# Patient Record
Sex: MALE | Race: WHITE | NOT HISPANIC OR LATINO | Employment: OTHER | URBAN - METROPOLITAN AREA
[De-identification: names, ages, dates, MRNs, and addresses within clinical notes are randomized per-mention and may not be internally consistent; named-entity substitution may affect disease eponyms.]

---

## 2018-05-08 ENCOUNTER — TRANSCRIBE ORDERS (OUTPATIENT)
Dept: ADMINISTRATIVE | Facility: HOSPITAL | Age: 68
End: 2018-05-08

## 2018-05-08 DIAGNOSIS — R10.9 ABDOMINAL PAIN, UNSPECIFIED ABDOMINAL LOCATION: Primary | ICD-10-CM

## 2018-05-09 ENCOUNTER — HOSPITAL ENCOUNTER (OUTPATIENT)
Dept: ULTRASOUND IMAGING | Facility: CLINIC | Age: 68
Discharge: HOME | End: 2018-05-09
Payer: MEDICARE

## 2018-05-09 DIAGNOSIS — R10.9 ABDOMINAL PAIN, UNSPECIFIED ABDOMINAL LOCATION: ICD-10-CM

## 2018-05-09 PROCEDURE — 76700 US EXAM ABDOM COMPLETE: CPT

## 2018-05-21 ENCOUNTER — TRANSCRIBE ORDERS (OUTPATIENT)
Dept: ADMINISTRATIVE | Facility: HOSPITAL | Age: 68
End: 2018-05-21

## 2018-05-21 DIAGNOSIS — N28.89 URETERAL FISTULA: Primary | ICD-10-CM

## 2018-05-24 ENCOUNTER — HOSPITAL ENCOUNTER (OUTPATIENT)
Dept: CT IMAGING | Facility: CLINIC | Age: 68
Discharge: HOME | End: 2018-05-24
Payer: MEDICARE

## 2018-05-24 DIAGNOSIS — N28.89 URETERAL FISTULA: ICD-10-CM

## 2018-05-24 PROCEDURE — 74178 CT ABD&PLV WO CNTR FLWD CNTR: CPT

## 2018-05-24 RX ADMIN — IOHEXOL 100 ML: 350 INJECTION, SOLUTION INTRAVENOUS at 10:37

## 2020-10-30 ENCOUNTER — TRANSCRIBE ORDERS (OUTPATIENT)
Dept: ADMINISTRATIVE | Facility: HOSPITAL | Age: 70
End: 2020-10-30

## 2020-10-30 DIAGNOSIS — Q44.5 CHOLEDOCHOCELE: Primary | ICD-10-CM

## 2020-11-13 ENCOUNTER — HOSPITAL ENCOUNTER (OUTPATIENT)
Dept: MRI IMAGING | Facility: HOSPITAL | Age: 70
Discharge: HOME | End: 2020-11-13
Payer: MEDICARE

## 2020-11-13 DIAGNOSIS — Q44.5 CHOLEDOCHOCELE: ICD-10-CM

## 2020-11-13 PROCEDURE — 74183 MRI ABD W/O CNTR FLWD CNTR: CPT

## 2020-11-13 PROCEDURE — G1004 CDSM NDSC: HCPCS

## 2020-11-13 PROCEDURE — A9585 GADOBUTROL INJECTION: HCPCS | Performed by: RADIOLOGY

## 2020-11-13 RX ADMIN — GADOBUTROL 12 ML: 604.72 INJECTION INTRAVENOUS at 14:52

## 2021-03-09 DIAGNOSIS — Z23 ENCOUNTER FOR IMMUNIZATION: ICD-10-CM

## 2021-08-04 ENCOUNTER — TELEPHONE (OUTPATIENT)
Dept: GERIATRICS | Age: 71
End: 2021-08-04

## 2021-08-04 NOTE — TELEPHONE ENCOUNTER
[address]  [phone]  Telephone Intake: PCP    Referral Source: Self (Spouse is patient of Dr Stewart Severe for PCP)       Caller (and relationship to patient): same     (relationship to patient): Kirsten Olivier Person Phone Number: [phone]   Is caller POA? no    Reason for choosing Geriatric PCP: Patient is 65 and needs someone to coordinate all of their specialist    Any additional concerns that you would like the provider to be aware of: none    NOTE FOR : Dr Wendy Glynn does not accept new PCP patients who reside in facilities (i e  levels of care higher than independent living)  She WILL accept patients at independent living facilities

## 2021-11-05 ENCOUNTER — OFFICE VISIT (OUTPATIENT)
Dept: GASTROENTEROLOGY | Facility: CLINIC | Age: 71
End: 2021-11-05
Payer: MEDICARE

## 2021-11-05 VITALS
BODY MASS INDEX: 34.13 KG/M2 | SYSTOLIC BLOOD PRESSURE: 132 MMHG | HEART RATE: 52 BPM | DIASTOLIC BLOOD PRESSURE: 82 MMHG | HEIGHT: 72 IN | WEIGHT: 252 LBS

## 2021-11-05 DIAGNOSIS — R10.9 CHRONIC FLANK PAIN: ICD-10-CM

## 2021-11-05 DIAGNOSIS — K85.80 OTHER ACUTE PANCREATITIS WITHOUT INFECTION OR NECROSIS: ICD-10-CM

## 2021-11-05 DIAGNOSIS — Q44.5 CHOLEDOCHOCELE: Primary | ICD-10-CM

## 2021-11-05 DIAGNOSIS — G89.29 CHRONIC FLANK PAIN: ICD-10-CM

## 2021-11-05 PROCEDURE — 99204 OFFICE O/P NEW MOD 45 MIN: CPT | Performed by: INTERNAL MEDICINE

## 2021-11-05 RX ORDER — IRBESARTAN 300 MG/1
1 TABLET ORAL DAILY
COMMUNITY

## 2021-11-05 RX ORDER — FINASTERIDE 5 MG
5 TABLET ORAL DAILY
COMMUNITY
Start: 2021-09-09 | End: 2021-12-09

## 2021-11-05 RX ORDER — ROSUVASTATIN CALCIUM 10 MG/1
10 TABLET, FILM COATED ORAL DAILY
COMMUNITY
Start: 2021-09-03 | End: 2021-12-09

## 2021-11-05 RX ORDER — RIFAXIMIN 550 MG/1
1 TABLET ORAL 2 TIMES DAILY PRN
COMMUNITY
Start: 2021-08-17

## 2021-11-05 RX ORDER — BETAMETHASONE DIPROPIONATE 0.5 MG/G
1 OINTMENT TOPICAL DAILY PRN
COMMUNITY

## 2021-11-05 RX ORDER — MAG HYDROX/ALUMINUM HYD/SIMETH 400-400-40
1 SUSPENSION, ORAL (FINAL DOSE FORM) ORAL DAILY
COMMUNITY
End: 2022-07-13

## 2021-11-05 RX ORDER — VIT A/VIT C/VIT E/ZINC/COPPER 4296-226
1 CAPSULE ORAL DAILY
COMMUNITY

## 2021-11-05 RX ORDER — METOPROLOL SUCCINATE 100 MG/1
100 TABLET, EXTENDED RELEASE ORAL DAILY
COMMUNITY
Start: 2021-09-02

## 2021-11-05 RX ORDER — MELOXICAM 15 MG/1
15 TABLET ORAL DAILY
COMMUNITY
Start: 2021-09-22

## 2021-11-05 RX ORDER — VITAMIN E/VITAMINS A AND D
1 CREAM (GRAM) TOPICAL DAILY PRN
COMMUNITY
End: 2022-07-13

## 2021-12-09 ENCOUNTER — OFFICE VISIT (OUTPATIENT)
Dept: GERIATRICS | Age: 71
End: 2021-12-09
Payer: MEDICARE

## 2021-12-09 VITALS
OXYGEN SATURATION: 93 % | BODY MASS INDEX: 36.21 KG/M2 | RESPIRATION RATE: 18 BRPM | HEIGHT: 73 IN | WEIGHT: 273.2 LBS | HEART RATE: 59 BPM | DIASTOLIC BLOOD PRESSURE: 70 MMHG | TEMPERATURE: 96.8 F | SYSTOLIC BLOOD PRESSURE: 134 MMHG

## 2021-12-09 DIAGNOSIS — K29.80 DUODENITIS DETERMINED BY BIOPSY: ICD-10-CM

## 2021-12-09 DIAGNOSIS — E78.2 MIXED HYPERLIPIDEMIA: ICD-10-CM

## 2021-12-09 DIAGNOSIS — K58.0 IRRITABLE BOWEL SYNDROME WITH DIARRHEA: ICD-10-CM

## 2021-12-09 DIAGNOSIS — L21.9 SEBORRHEIC DERMATITIS: ICD-10-CM

## 2021-12-09 DIAGNOSIS — E66.09 CLASS 2 OBESITY DUE TO EXCESS CALORIES WITHOUT SERIOUS COMORBIDITY WITH BODY MASS INDEX (BMI) OF 35.0 TO 35.9 IN ADULT: ICD-10-CM

## 2021-12-09 DIAGNOSIS — Q44.5 CHOLEDOCHOCELE: ICD-10-CM

## 2021-12-09 DIAGNOSIS — N40.1 BENIGN PROSTATIC HYPERPLASIA WITH URINARY FREQUENCY: ICD-10-CM

## 2021-12-09 DIAGNOSIS — K63.89 SMALL INTESTINAL BACTERIAL OVERGROWTH (SIBO): Primary | ICD-10-CM

## 2021-12-09 DIAGNOSIS — I10 ESSENTIAL HYPERTENSION: ICD-10-CM

## 2021-12-09 DIAGNOSIS — Z23 NEED FOR VACCINATION: ICD-10-CM

## 2021-12-09 DIAGNOSIS — D12.3 ADENOMATOUS POLYP OF TRANSVERSE COLON: ICD-10-CM

## 2021-12-09 DIAGNOSIS — R35.0 BENIGN PROSTATIC HYPERPLASIA WITH URINARY FREQUENCY: ICD-10-CM

## 2021-12-09 DIAGNOSIS — M19.90 ARTHRITIS: ICD-10-CM

## 2021-12-09 DIAGNOSIS — H61.22 IMPACTED CERUMEN OF LEFT EAR: ICD-10-CM

## 2021-12-09 DIAGNOSIS — H10.13 ALLERGIC CONJUNCTIVITIS OF BOTH EYES: ICD-10-CM

## 2021-12-09 PROBLEM — R73.03 PREDIABETES: Status: ACTIVE | Noted: 2020-03-10

## 2021-12-09 PROBLEM — E66.812 CLASS 2 OBESITY DUE TO EXCESS CALORIES WITHOUT SERIOUS COMORBIDITY WITH BODY MASS INDEX (BMI) OF 35.0 TO 35.9 IN ADULT: Status: ACTIVE | Noted: 2021-12-09

## 2021-12-09 PROBLEM — R73.03 PREDIABETES: Status: RESOLVED | Noted: 2020-03-10 | Resolved: 2021-12-09

## 2021-12-09 PROBLEM — K63.8219 SMALL INTESTINAL BACTERIAL OVERGROWTH (SIBO): Status: ACTIVE | Noted: 2021-12-09

## 2021-12-09 PROCEDURE — 69210 REMOVE IMPACTED EAR WAX UNI: CPT | Performed by: INTERNAL MEDICINE

## 2021-12-09 PROCEDURE — 99205 OFFICE O/P NEW HI 60 MIN: CPT | Performed by: INTERNAL MEDICINE

## 2021-12-09 RX ORDER — ROSUVASTATIN CALCIUM 10 MG/1
10 TABLET, COATED ORAL DAILY
COMMUNITY

## 2021-12-09 RX ORDER — TAMSULOSIN HYDROCHLORIDE 0.4 MG/1
0.4 CAPSULE ORAL
Qty: 90 CAPSULE | Refills: 3 | Status: SHIPPED | OUTPATIENT
Start: 2021-12-09

## 2021-12-09 RX ORDER — ASPIRIN 81 MG/1
81 TABLET ORAL DAILY
COMMUNITY

## 2021-12-09 RX ORDER — LIFITEGRAST 50 MG/ML
1 SOLUTION/ DROPS OPHTHALMIC 2 TIMES DAILY
COMMUNITY
Start: 2021-12-09

## 2021-12-09 RX ORDER — ESOMEPRAZOLE MAGNESIUM 40 MG/1
40 CAPSULE, DELAYED RELEASE ORAL DAILY
COMMUNITY

## 2021-12-09 RX ORDER — FINASTERIDE 5 MG/1
5 TABLET, FILM COATED ORAL DAILY
COMMUNITY

## 2021-12-09 RX ORDER — BEPOTASTINE BESILATE 15 MG/ML
1 SOLUTION/ DROPS OPHTHALMIC 2 TIMES DAILY PRN
COMMUNITY
Start: 2021-11-11

## 2021-12-09 RX ORDER — METRONIDAZOLE 10 MG/G
1 GEL TOPICAL DAILY PRN
COMMUNITY

## 2022-01-03 ENCOUNTER — TELEPHONE (OUTPATIENT)
Dept: SURGERY | Facility: HOSPITAL | Age: 72
End: 2022-01-03

## 2022-01-04 ENCOUNTER — ANESTHESIA (OUTPATIENT)
Dept: PERIOP | Facility: HOSPITAL | Age: 72
End: 2022-01-04

## 2022-01-04 ENCOUNTER — HOSPITAL ENCOUNTER (OUTPATIENT)
Dept: PERIOP | Facility: HOSPITAL | Age: 72
Setting detail: OUTPATIENT SURGERY
Discharge: HOME | End: 2022-01-04
Attending: INTERNAL MEDICINE
Payer: MEDICARE

## 2022-01-04 ENCOUNTER — ANESTHESIA EVENT (OUTPATIENT)
Dept: PERIOP | Facility: HOSPITAL | Age: 72
End: 2022-01-04

## 2022-01-04 ENCOUNTER — HOSPITAL ENCOUNTER (OUTPATIENT)
Dept: RADIOLOGY | Facility: HOSPITAL | Age: 72
Setting detail: OUTPATIENT SURGERY
Discharge: HOME | End: 2022-01-04
Payer: MEDICARE

## 2022-01-04 VITALS
HEIGHT: 72 IN | WEIGHT: 273.15 LBS | SYSTOLIC BLOOD PRESSURE: 139 MMHG | HEART RATE: 60 BPM | TEMPERATURE: 97.2 F | RESPIRATION RATE: 14 BRPM | BODY MASS INDEX: 37 KG/M2 | DIASTOLIC BLOOD PRESSURE: 71 MMHG | OXYGEN SATURATION: 94 %

## 2022-01-04 DIAGNOSIS — Q44.5 CHOLEDOCHOCELE: ICD-10-CM

## 2022-01-04 PROCEDURE — C1769 GUIDE WIRE: HCPCS

## 2022-01-04 PROCEDURE — 43262 ENDO CHOLANGIOPANCREATOGRAPH: CPT | Performed by: INTERNAL MEDICINE

## 2022-01-04 PROCEDURE — C1726 CATH, BAL DIL, NON-VASCULAR: HCPCS

## 2022-01-04 PROCEDURE — 43277 ERCP EA DUCT/AMPULLA DILATE: CPT | Performed by: INTERNAL MEDICINE

## 2022-01-04 PROCEDURE — 74328 X-RAY BILE DUCT ENDOSCOPY: CPT

## 2022-01-04 RX ORDER — FENTANYL CITRATE/PF 50 MCG/ML
50 SYRINGE (ML) INJECTION
Status: DISCONTINUED | OUTPATIENT
Start: 2022-01-04 | End: 2022-01-04 | Stop reason: HOSPADM

## 2022-01-04 RX ORDER — EPHEDRINE SULFATE 50 MG/ML
INJECTION INTRAVENOUS AS NEEDED
Status: DISCONTINUED | OUTPATIENT
Start: 2022-01-04 | End: 2022-01-04

## 2022-01-04 RX ORDER — NEOSTIGMINE METHYLSULFATE 1 MG/ML
INJECTION INTRAVENOUS AS NEEDED
Status: DISCONTINUED | OUTPATIENT
Start: 2022-01-04 | End: 2022-01-04

## 2022-01-04 RX ORDER — LIDOCAINE HYDROCHLORIDE 10 MG/ML
INJECTION, SOLUTION EPIDURAL; INFILTRATION; INTRACAUDAL; PERINEURAL AS NEEDED
Status: DISCONTINUED | OUTPATIENT
Start: 2022-01-04 | End: 2022-01-04

## 2022-01-04 RX ORDER — GLYCOPYRROLATE 0.2 MG/ML
INJECTION INTRAMUSCULAR; INTRAVENOUS AS NEEDED
Status: DISCONTINUED | OUTPATIENT
Start: 2022-01-04 | End: 2022-01-04

## 2022-01-04 RX ORDER — PROPOFOL 10 MG/ML
INJECTION, EMULSION INTRAVENOUS AS NEEDED
Status: DISCONTINUED | OUTPATIENT
Start: 2022-01-04 | End: 2022-01-04

## 2022-01-04 RX ORDER — ONDANSETRON 2 MG/ML
4 INJECTION INTRAMUSCULAR; INTRAVENOUS ONCE AS NEEDED
Status: DISCONTINUED | OUTPATIENT
Start: 2022-01-04 | End: 2022-01-04 | Stop reason: HOSPADM

## 2022-01-04 RX ORDER — SODIUM CHLORIDE, SODIUM LACTATE, POTASSIUM CHLORIDE, CALCIUM CHLORIDE 600; 310; 30; 20 MG/100ML; MG/100ML; MG/100ML; MG/100ML
125 INJECTION, SOLUTION INTRAVENOUS CONTINUOUS
Status: DISCONTINUED | OUTPATIENT
Start: 2022-01-04 | End: 2022-01-08 | Stop reason: HOSPADM

## 2022-01-04 RX ORDER — ONDANSETRON 2 MG/ML
INJECTION INTRAMUSCULAR; INTRAVENOUS AS NEEDED
Status: DISCONTINUED | OUTPATIENT
Start: 2022-01-04 | End: 2022-01-04

## 2022-01-04 RX ORDER — SODIUM CHLORIDE, SODIUM LACTATE, POTASSIUM CHLORIDE, CALCIUM CHLORIDE 600; 310; 30; 20 MG/100ML; MG/100ML; MG/100ML; MG/100ML
100 INJECTION, SOLUTION INTRAVENOUS CONTINUOUS
Status: CANCELLED | OUTPATIENT
Start: 2022-01-04

## 2022-01-04 RX ORDER — ROCURONIUM BROMIDE 10 MG/ML
INJECTION, SOLUTION INTRAVENOUS AS NEEDED
Status: DISCONTINUED | OUTPATIENT
Start: 2022-01-04 | End: 2022-01-04

## 2022-01-04 RX ADMIN — IOHEXOL 35 ML: 240 INJECTION, SOLUTION INTRATHECAL; INTRAVASCULAR; INTRAVENOUS; ORAL at 13:50

## 2022-01-04 RX ADMIN — INDOMETHACIN 100 MG: 50 SUPPOSITORY RECTAL at 13:13

## 2022-01-04 RX ADMIN — ONDANSETRON 4 MG: 2 INJECTION INTRAMUSCULAR; INTRAVENOUS at 13:30

## 2022-01-04 RX ADMIN — PROPOFOL 200 MG: 10 INJECTION, EMULSION INTRAVENOUS at 13:16

## 2022-01-04 RX ADMIN — NEOSTIGMINE METHYLSULFATE 4 MG: 1 INJECTION INTRAVENOUS at 13:50

## 2022-01-04 RX ADMIN — LIDOCAINE HYDROCHLORIDE 50 MG: 10 INJECTION, SOLUTION EPIDURAL; INFILTRATION; INTRACAUDAL; PERINEURAL at 13:17

## 2022-01-04 RX ADMIN — EPHEDRINE SULFATE 10 MG: 50 INJECTION, SOLUTION INTRAVENOUS at 13:45

## 2022-01-04 RX ADMIN — GLYCOPYRROLATE 0.4 MG: 0.2 INJECTION, SOLUTION INTRAMUSCULAR; INTRAVENOUS at 13:50

## 2022-01-04 RX ADMIN — ROCURONIUM BROMIDE 30 MG: 10 INJECTION, SOLUTION INTRAVENOUS at 13:17

## 2022-01-04 RX ADMIN — SODIUM CHLORIDE, SODIUM LACTATE, POTASSIUM CHLORIDE, AND CALCIUM CHLORIDE 125 ML/HR: .6; .31; .03; .02 INJECTION, SOLUTION INTRAVENOUS at 11:23

## 2022-01-04 NOTE — ANESTHESIA PREPROCEDURE EVALUATION
Procedure:  ERCP    Relevant Problems   CARDIO   (+) Essential hypertension   (+) Mixed hyperlipidemia      MUSCULOSKELETAL   (+) Arthritis        Physical Exam    Airway    Mallampati score: III  TM Distance: >3 FB  Neck ROM: full     Dental   No notable dental hx     Cardiovascular  Rhythm: regular, Rate: normal, No murmur, Cardiovascular exam normal    Pulmonary  Pulmonary exam normal Breath sounds clear to auscultation, No wheezes,     Other Findings        Anesthesia Plan  ASA Score- 3     Anesthesia Type- general with ASA Monitors  Additional Monitors:   Airway Plan: ETT  Plan Factors-Exercise tolerance (METS): >4 METS  Chart reviewed  EKG reviewed  Existing labs reviewed  Patient is not a current smoker  Patient instructed to abstain from smoking on day of procedure  Patient did not smoke on day of surgery  There is medical exclusion for perioperative obstructive sleep apnea risk education  Induction- intravenous  Postoperative Plan-   Planned trial extubation    Informed Consent- Anesthetic plan and risks discussed with patient  I personally reviewed this patient with the CRNA  Discussed and agreed on the Anesthesia Plan with the CRNA  Kitty Key

## 2022-01-04 NOTE — ANESTHESIA POSTPROCEDURE EVALUATION
Post-Op Assessment Note    CV Status:  Stable  Pain Score: 0    Pain management: adequate     Mental Status:  Alert   Hydration Status:  Stable   PONV Controlled:  Controlled   Airway Patency:  Patent      Post Op Vitals Reviewed: Yes      Staff: Anesthesiologist, CRNA         No complications documented      BP     Temp (!) (P) 97 2 °F (36 2 °C) (01/04/22 1400)    Pulse     Resp (P) 16 (01/04/22 1400)    SpO2

## 2022-01-04 NOTE — DISCHARGE INSTRUCTIONS
ERCP (Endoscopic Retrograde Cholangiopancreatography)   WHAT YOU NEED TO KNOW:   An ERCP (endoscopic retrograde cholangiopancreatography) is a procedure to examine the ducts of your pancreas or gallbladder  ERCP may also be used to open blocked ducts, or to diagnose problems with your pancreas or gallbladder  An endoscope (thin, flexible tube with a light) will be used for the procedure  DISCHARGE INSTRUCTIONS:   Seek care immediately if:   · You have sudden, severe abdominal pain  · You have problems swallowing  · You have a large amount of black, sticky bowel movements or blood in your bowel movements  · You have sudden trouble breathing  · You feel weak, lightheaded, or faint or your heart beats faster than normal for you  Contact your healthcare provider if:   · You have a fever and chills  · You have nausea or are vomiting  · Your abdomen is bloated or feels full and hard  · You have abdominal pain  · You have a large amount of black, sticky bowel movements or blood in your bowel movements  · You have not had a bowel movement for 3 days after your procedure  · You have rash or hives  · You lose your appetite, your skin feels itchy, and your skin turns yellow  · You have questions or concerns about your procedure  Self-care:   ·      Rest when you feel it is needed  You may be drowsy for up to 24 hours after your procedure  Return to your daily activities as directed  ·       Ask when you can eat regular foods  Healthy foods include fruits, vegetables, whole-grain breads, low-fat dairy products, beans, lean meats, and fish  ·       Relieve a sore throat with ice chips, liquids, or lozenges as directed  Follow up with your healthcare provider as directed: Write down your questions so you remember to ask them during your visits        If you take a blood thinner, please review the specific instructions from your endoscopist about when you should resume it  These can be found in the Recommendation and Your Medication list sections of this After Visit Summary

## 2022-01-04 NOTE — H&P
History and Physical -  [facility]  Sanna De La Rosa 66 y o  male MRN: [id number]      HPI: Sanna De La Rosa is a 66y o  year old male who presents for recurring episodes of diarrhea as well as flank pain with a known history of a type 3 choledochocele      REVIEW OF SYSTEMS: Per the HPI, and otherwise unremarkable  Historical Information   Past Medical History:   Diagnosis Date    Adenomatous polyp of transverse colon 12/9/2021    10/26/20 Tubular adenoma with focal high grade dysplasia  Also transverse tubular adenoma, Splenic flexure tubular adenoma    Arthritis     Hyperlipidemia     Hypertension      Past Surgical History:   Procedure Laterality Date    CHOLECYSTECTOMY  2001    EYE SURGERY       Social History   Social History     Substance and Sexual Activity   Alcohol Use Yes    Comment: occ     Social History     Substance and Sexual Activity   Drug Use Never     Social History     Tobacco Use   Smoking Status Never Smoker   Smokeless Tobacco Never Used     Family History   Problem Relation Age of Onset    Cancer Mother     Cancer Father        Meds/Allergies     (Not in a hospital admission)      No Known Allergies    Objective     Blood pressure (!) 176/96, pulse 78, temperature 97 9 °F (36 6 °C), temperature source Temporal, resp  rate 18, height 6' (1 829 m), weight 124 kg (273 lb 2 4 oz), SpO2 97 %  PHYSICAL EXAM    Gen: NAD  CV: RRR  CHEST: Clear  ABD: soft, NT/ND  EXT: no edema      ASSESSMENT/PLAN:  This is a 66y o  year old male here for endoscopic retrograde cholangiopancreatography with sphincterotomy, and he is stable and optimized for his procedure

## 2022-01-17 ENCOUNTER — TELEPHONE (OUTPATIENT)
Dept: GASTROENTEROLOGY | Facility: CLINIC | Age: 72
End: 2022-01-17

## 2022-01-17 NOTE — TELEPHONE ENCOUNTER
Unfortunately if patient has post ERCP pancreatitis he should be evaluated in the emergency room and will need IV fluids  If patient is still in severe pain I would instruct him to go to the emergency room  If his pain has improved we could order outpatient laboratories and a CT scan    Thank you

## 2022-01-17 NOTE — TELEPHONE ENCOUNTER
----- Message from [facility] sent at 2022  3:22 PM EST -----  Regarding: Post procedure pain  On 22 I had a procedure at the [facility] performed by Dr Wadie Gilford  He advised that I may experience a pancreatitis type pain  He was correct and the pain level is higher than prior to the procedure  I have a follow up visit on 22 but would prefer not to remain in pain until then  Also, the dr advised that the procedure may not have addressed the issue and I agree  Is there anything I can do or take to ease the pain level in the interim  Also, if there are tests needed to re-diagnose the problem I would prefer to get them done prior to the office visit so the dr will be able to help with this problem  Please advise     [facility]  1950  [phone]

## 2022-01-25 ENCOUNTER — TELEPHONE (OUTPATIENT)
Dept: GASTROENTEROLOGY | Facility: CLINIC | Age: 72
End: 2022-01-25

## 2022-01-25 NOTE — TELEPHONE ENCOUNTER
lmom patient has appt on 2/16 with Dr Chandni Clarke advised in message I would send this off to see if this appt should be moved up

## 2022-01-25 NOTE — TELEPHONE ENCOUNTER
----- Message from Oscar Agustin sent at 2022 12:15 PM EST -----  Regarding: Next visit on   I have a projected follow up visit scheduled as above  Since my procedure on 22 I have continued to have the pain/discomfort in the right rear quadrant of my abdomen  The frequency of the pain, as well as the severity, is as before the procedure  I am expecting to have to schedule additional testing  If these tests can be ordered by the doctor, and I can get them accomplished prior to my scheduled visit, that will expedite the diagnosis  Please advise me if this is a possibility   Thanks  Cassie mistry 1950

## 2022-01-25 NOTE — TELEPHONE ENCOUNTER
Dr Suki Dolan - patient called back  He would like to have Dr Suki Dolan, place the orders for the additional test the Doctor would like patient to have, so the results are back before patient's office visit on 02/16/22  Kale Bhandari Please call patient when orders are entered at [phone]

## 2022-01-25 NOTE — TELEPHONE ENCOUNTER
Advised patient also gave central scheduling's number, will come by to  lab orders he goes to [facility]

## 2022-02-02 ENCOUNTER — APPOINTMENT (OUTPATIENT)
Dept: LAB | Facility: HOSPITAL | Age: 72
End: 2022-02-02
Payer: MEDICARE

## 2022-02-02 ENCOUNTER — HOSPITAL ENCOUNTER (OUTPATIENT)
Dept: CT IMAGING | Facility: HOSPITAL | Age: 72
Discharge: HOME | End: 2022-02-02
Payer: MEDICARE

## 2022-02-02 DIAGNOSIS — Q44.5 CHOLEDOCHOCELE: ICD-10-CM

## 2022-02-02 PROCEDURE — G1004 CDSM NDSC: HCPCS

## 2022-02-02 PROCEDURE — 74177 CT ABD & PELVIS W/CONTRAST: CPT

## 2022-02-02 RX ADMIN — IOHEXOL 100 ML: 350 INJECTION, SOLUTION INTRAVENOUS at 10:11

## 2022-02-16 ENCOUNTER — OFFICE VISIT (OUTPATIENT)
Dept: GASTROENTEROLOGY | Facility: CLINIC | Age: 72
End: 2022-02-16
Payer: MEDICARE

## 2022-02-16 VITALS
HEIGHT: 72 IN | DIASTOLIC BLOOD PRESSURE: 82 MMHG | HEART RATE: 61 BPM | WEIGHT: 268 LBS | SYSTOLIC BLOOD PRESSURE: 140 MMHG | BODY MASS INDEX: 36.3 KG/M2

## 2022-02-16 DIAGNOSIS — R10.9 RIGHT FLANK PAIN, CHRONIC: Primary | ICD-10-CM

## 2022-02-16 DIAGNOSIS — G89.29 RIGHT FLANK PAIN, CHRONIC: Primary | ICD-10-CM

## 2022-02-16 PROCEDURE — 99214 OFFICE O/P EST MOD 30 MIN: CPT | Performed by: INTERNAL MEDICINE

## 2022-02-16 PROCEDURE — 1124F ACP DISCUSS-NO DSCNMKR DOCD: CPT | Performed by: INTERNAL MEDICINE

## 2022-02-16 NOTE — PROGRESS NOTES
[facility] - Outpatient Follow-up Note  Maury Galvan 66 y o  male MRN: [id number]  Encounter: [id number]          ASSESSMENT AND PLAN:      1  Right flank pain, chronic    No additional medical therapy required at this time  I reassured the patient and told him that it is quite possible in this right flank pain is a musculoskeletal pain  It is also possible this could be related to a referred pain that is a from gastric distention  No additional diagnostic testing is required at this time    We talked about the diverticulosis as seen on CT scan and the importance of a high-fiber diet on a daily basis  Questions were answered regarding his atelectasis and wanted represented     ______________________________________________________________________    LENORESantadrian Fritz returns the office today for routine follow-up  He states that he has been having last right sided flank pain  The pain seems to come on unpredictably  It is not related eating  The episodes are less frequent and perhaps less severe  He denies any new symptoms  He denies any nausea vomiting  There is no heartburn or dysphagia  He had questions about his CT scan including a question about atelectasis  There is no fever chills  He does complain of night sweats which have been chronic and unpredictable over the past decade or more  He denies any significant weight loss or weight gain  He asked about the finding of atelectasis as seen on his CT scan  CT scan also showed diverticulosis coli  There was no acute abdominal pathology identified  REVIEW OF SYSTEMS IS OTHERWISE NEGATIVE  Historical Information   Past Medical History:   Diagnosis Date    Adenomatous polyp of transverse colon 12/9/2021    10/26/20 Tubular adenoma with focal high grade dysplasia   Also transverse tubular adenoma, Splenic flexure tubular adenoma    Arthritis     Hyperlipidemia     Hypertension      Past Surgical History: Procedure Laterality Date    CHOLECYSTECTOMY  2001    EYE SURGERY       Social History   Social History     Substance and Sexual Activity   Alcohol Use Yes    Comment: occ     Social History     Substance and Sexual Activity   Drug Use Never     Social History     Tobacco Use   Smoking Status Never Smoker   Smokeless Tobacco Never Used     Family History   Problem Relation Age of Onset    Cancer Mother     Cancer Father        Meds/Allergies       Current Outpatient Medications:     aspirin (ECOTRIN LOW STRENGTH) 81 mg EC tablet    bepotastine besilate (BEPREVE) 1 5 % op soln    betamethasone, augmented, (Diprolene) 0 05 % ointment    Cholecalciferol 50 MCG (2000 UT) CAPS    esomeprazole (NexIUM) 40 MG capsule    finasteride (PROSCAR) 5 mg tablet    irbesartan (Avapro) 300 mg tablet    meloxicam (MOBIC) 15 mg tablet    metoprolol succinate (TOPROL-XL) 100 mg 24 hr tablet    metroNIDAZOLE (METROGEL) 1 % gel    Multiple Vitamins-Minerals (PreserVision AREDS) CAPS    RA Vitamin E-Vit A & D 89652 units CREA    rosuvastatin (CRESTOR) 10 MG tablet    Saw Palmetto 450 MG CAPS    tamsulosin (FLOMAX) 0 4 mg    Xifaxan 550 MG tablet    Xiidra 5 % op solution    No Known Allergies        Objective     Blood pressure 140/82, pulse 61, height 6' (1 829 m), weight 122 kg (268 lb)  Body mass index is 36 35 kg/m²  PHYSICAL EXAM:      General Appearance:   Alert, cooperative, no distress   HEENT:   Normocephalic, atraumatic, anicteric      Neck:  Supple, symmetrical, trachea midline   Lungs:   Clear to auscultation bilaterally; no rales, rhonchi or wheezing; respirations unlabored    Heart[de-identified]   Regular rate and rhythm; no murmur, rub, or gallop     Abdomen:   Soft, non-tender, non-distended; normal bowel sounds; no masses, no organomegaly    Genitalia:   Deferred    Rectal:   Deferred    Extremities:  No cyanosis, clubbing or edema    Pulses:  2+ and symmetric    Skin:  No jaundice, rashes, or lesions  Lymph nodes:  No palpable cervical lymphadenopathy        Lab Results:   No visits with results within 1 Day(s) from this visit  Latest known visit with results is:   Orders Only on 08/23/2021   Component Date Value    Hemoglobin A1C 08/23/2021 5 5     SARS COV-2 ANTIBODY IGG 08/23/2021 Positive*    SARS COV-2 ANTIBODY IGM 08/23/2021 Negative          Radiology Results:   CT abdomen pelvis w contrast    Result Date: 2/4/2022  Narrative: CT ABDOMEN AND PELVIS WITH IV CONTRAST INDICATION:   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  Abdominal pain COMPARISON:  CT from May 24, 2018 TECHNIQUE:  CT examination of the abdomen and pelvis was performed  Axial, sagittal, and coronal 2D reformatted images were created from the source data and submitted for interpretation  Radiation dose length product (DLP) for this visit:  1423 mGy-cm   This examination, like all CT scans performed in the [facility], was performed utilizing techniques to minimize radiation dose exposure, including the use of iterative reconstruction and automated exposure control  IV Contrast:  100 mL of iohexol (OMNIPAQUE) Enteric Contrast:  Enteric contrast was not administered  FINDINGS: ABDOMEN LOWER CHEST: Bibasilar density seen related to atelectasis LIVER/BILIARY TREE:  Again noted are hepatic cysts, stable the largest hepatic cyst is seen in the segment 2, measuring about 2 4 cm  A segment 7/8 cyst seen measuring about 1 3 cm GALLBLADDER:  Gallbladder is surgically absent SPLEEN:  Unremarkable  PANCREAS:  No pancreatic ductal dilation seen Few foci of calcification are noted within the pancreas Cyst is seen in the pancreatic head, measuring about 7 mm, stable  Additional smaller cysts which were described on the previous MRI are occult on the CT ADRENAL GLANDS:  Unremarkable  KIDNEYS/URETERS:  Unremarkable  No hydronephrosis   STOMACH AND BOWEL:  Diverticulosis seen Ileocecal junction appear unremarkable No abnormal dilation of the small bowel loops seen APPENDIX:  No findings to suggest appendicitis  ABDOMINOPELVIC CAVITY:  No ascites  No pneumoperitoneum  No lymphadenopathy  VESSELS:  Celiac trunk, SMA appear unremarkable DIANA appear unremarkable PELVIS REPRODUCTIVE ORGANS:  Prostate appears unremarkable URINARY BLADDER:  Unremarkable  ABDOMINAL WALL/INGUINAL REGIONS:  Umbilical hernia seen OSSEOUS STRUCTURES:  No acute compression collapse No gross lytic lesion seen     Impression: No acute inflammatory stranding No evidence of pancreatitis Small 7 mm cystic pancreatic lesion as seen on the previous MRI and previous CT from 2018, stable for more than 3 years  Surveillance can be continued as per the MRI recommendations from November 13, 2020 Workstation performed: [id number]     Answers for HPI/ROS submitted by the patient on 2/9/2022  Chronicity: chronic  Onset: more than 1 year ago  Onset quality: sudden  Frequency: daily  Progression since onset: waxing and waning  Pain location: right flank  Pain - numeric: 5/10  Pain quality: burning, cramping  Radiates to: right flank  anorexia: No  arthralgias: No  belching: Yes  constipation: Yes  diarrhea: Yes  dysuria: No  fever: No  flatus: Yes  frequency: Yes  headaches: No  hematochezia: No  hematuria: No  melena: No  myalgias: No  nausea:  No  weight loss: No  vomiting: No  Aggravated by: movement  Relieved by: nothing  Diagnostic workup: CT scan

## 2022-03-03 ENCOUNTER — OFFICE VISIT (OUTPATIENT)
Dept: GERIATRICS | Age: 72
End: 2022-03-03
Payer: MEDICARE

## 2022-03-03 VITALS
BODY MASS INDEX: 35.73 KG/M2 | RESPIRATION RATE: 18 BRPM | OXYGEN SATURATION: 95 % | TEMPERATURE: 97.6 F | HEART RATE: 91 BPM | SYSTOLIC BLOOD PRESSURE: 154 MMHG | HEIGHT: 73 IN | WEIGHT: 269.6 LBS | DIASTOLIC BLOOD PRESSURE: 92 MMHG

## 2022-03-03 DIAGNOSIS — R35.0 BENIGN PROSTATIC HYPERPLASIA WITH URINARY FREQUENCY: ICD-10-CM

## 2022-03-03 DIAGNOSIS — N40.1 BENIGN PROSTATIC HYPERPLASIA WITH URINARY FREQUENCY: ICD-10-CM

## 2022-03-03 DIAGNOSIS — F51.01 PRIMARY INSOMNIA: ICD-10-CM

## 2022-03-03 DIAGNOSIS — R07.89 COSTOCHONDRAL PAIN: Primary | ICD-10-CM

## 2022-03-03 DIAGNOSIS — E66.09 CLASS 2 OBESITY DUE TO EXCESS CALORIES WITHOUT SERIOUS COMORBIDITY WITH BODY MASS INDEX (BMI) OF 35.0 TO 35.9 IN ADULT: ICD-10-CM

## 2022-03-03 PROBLEM — E66.812 CLASS 2 OBESITY DUE TO EXCESS CALORIES WITHOUT SERIOUS COMORBIDITY WITH BODY MASS INDEX (BMI) OF 35.0 TO 35.9 IN ADULT: Chronic | Status: ACTIVE | Noted: 2021-12-09

## 2022-03-03 PROBLEM — Z23 NEED FOR VACCINATION: Chronic | Status: ACTIVE | Noted: 2021-12-09

## 2022-03-03 PROBLEM — K63.8219 SMALL INTESTINAL BACTERIAL OVERGROWTH (SIBO): Chronic | Status: ACTIVE | Noted: 2021-12-09

## 2022-03-03 PROBLEM — K63.89 SMALL INTESTINAL BACTERIAL OVERGROWTH (SIBO): Chronic | Status: ACTIVE | Noted: 2021-12-09

## 2022-03-03 PROCEDURE — 99214 OFFICE O/P EST MOD 30 MIN: CPT | Performed by: INTERNAL MEDICINE

## 2022-03-03 RX ORDER — TRAZODONE HYDROCHLORIDE 50 MG/1
50 TABLET ORAL
Qty: 90 TABLET | Refills: 3 | Status: SHIPPED | OUTPATIENT
Start: 2022-03-03 | End: 2022-04-28 | Stop reason: SDUPTHER

## 2022-03-03 NOTE — PROGRESS NOTES
Assessment/Plan:    1  Costochondral pain  Assessment & Plan:  Counseled on benign syndrome  Recommend weight loss  2  Class 2 obesity due to excess calories without serious comorbidity with body mass index (BMI) of 35 0 to 35 9 in adult  Assessment & Plan:  Counseled extensively on need to exercise regularly  3  Benign prostatic hyperplasia with urinary frequency  Assessment & Plan:  Much improved  Continue finasteride/tamsulosin  4  Primary insomnia  Assessment & Plan:  Recommend to stop Zquil  Will start trial of trazodone  Orders:  -     traZODone (DESYREL) 50 mg tablet; Take 1 tablet (50 mg total) by mouth daily at bedtime        Subjective:      Patient ID: Matteo Quiñones is a 66 y o  male  HPI    Insomnia-reports using ZzzQuil every night to get to sleep  Requests considering something different  BPH-reports much improvement with tamsulosin  Obesity-denies regular exercise  Has treadmill at home but isn't using  Pain-patient reports having pain on right side, reports feels that his internal   Pain appears to be sharp and stabbing seems to come at random without radiation  The following portions of the patient's history were reviewed and updated as appropriate: allergies, current medications, past family history, past medical history, past social history, past surgical history and problem list     Review of Systems   Constitutional: Negative for chills and fever  HENT: Negative for trouble swallowing and voice change  Eyes: Negative for pain and discharge  Respiratory: Negative for shortness of breath and wheezing  Cardiovascular: Negative for chest pain and palpitations  Gastrointestinal: Negative for abdominal pain and blood in stool  Genitourinary: Negative for dysuria and hematuria  Musculoskeletal: Negative for joint swelling and neck pain  Skin: Negative for rash and wound     Allergic/Immunologic: Negative for environmental allergies and food allergies  Neurological: Negative for seizures and syncope  Psychiatric/Behavioral: Negative for dysphoric mood  The patient is not nervous/anxious  Objective:    /92 (BP Location: Left arm, Patient Position: Sitting, Cuff Size: Standard)   Pulse 91   Temp 97 6 °F (36 4 °C) (Temporal)   Resp 18   Ht 6' 1" (1 854 m)   Wt 122 kg (269 lb 9 6 oz)   SpO2 95%   BMI 35 57 kg/m²      Physical Exam  Constitutional:       General: He is not in acute distress  HENT:      Head: Normocephalic and atraumatic  Right Ear: External ear normal       Left Ear: External ear normal       Nose: Nose normal    Eyes:      General: No scleral icterus  Conjunctiva/sclera: Conjunctivae normal    Pulmonary:      Effort: Pulmonary effort is normal  No respiratory distress  Abdominal:      General: There is no distension  Tenderness: There is abdominal tenderness (pain reproduced when pushing on R lower rib, midaxillary line)  Skin:     Coloration: Skin is not jaundiced or pale  Neurological:      General: No focal deficit present  Mental Status: He is alert  Psychiatric:         Mood and Affect: Mood normal          Thought Content:  Thought content normal            Labs/Imagin2022:  WBC 6 8, hemoglobin 16 1, platelet 056, glucose 103, BUN 19, creatinine 1 04, GFR 71, sodium 141, total protein 6 7, albumin 4 6, amylase 65, lipase 37

## 2022-07-13 ENCOUNTER — OFFICE VISIT (OUTPATIENT)
Dept: GERIATRICS | Age: 72
End: 2022-07-13
Payer: MEDICARE

## 2022-07-13 VITALS
TEMPERATURE: 97.9 F | HEIGHT: 73 IN | BODY MASS INDEX: 36.47 KG/M2 | WEIGHT: 275.2 LBS | HEART RATE: 68 BPM | DIASTOLIC BLOOD PRESSURE: 82 MMHG | SYSTOLIC BLOOD PRESSURE: 130 MMHG | OXYGEN SATURATION: 94 % | RESPIRATION RATE: 16 BRPM

## 2022-07-13 DIAGNOSIS — L71.9 ROSACEA: ICD-10-CM

## 2022-07-13 DIAGNOSIS — R35.0 BENIGN PROSTATIC HYPERPLASIA WITH URINARY FREQUENCY: Primary | Chronic | ICD-10-CM

## 2022-07-13 DIAGNOSIS — F51.01 PRIMARY INSOMNIA: Chronic | ICD-10-CM

## 2022-07-13 DIAGNOSIS — N40.1 BENIGN PROSTATIC HYPERPLASIA WITH URINARY FREQUENCY: Primary | Chronic | ICD-10-CM

## 2022-07-13 PROCEDURE — 99214 OFFICE O/P EST MOD 30 MIN: CPT | Performed by: INTERNAL MEDICINE

## 2022-07-13 NOTE — ASSESSMENT & PLAN NOTE
Improved with tamsulosin  Will continue both tamsulosin/finasteride  Considering continued urinary urgency and occasional incontinence despite therapy, refer to urology

## 2022-07-13 NOTE — PROGRESS NOTES
Assessment/Plan:    1  Benign prostatic hyperplasia with urinary frequency  Assessment & Plan:  Improved with tamsulosin  Will continue both tamsulosin/finasteride  Considering continued urinary urgency and occasional incontinence despite therapy, refer to urology  Orders:  -     Ambulatory Referral to Urology; Future    2  Rosacea  -     Ambulatory referral to Dermatology; Future    3  Primary insomnia  Assessment & Plan:  Stable  Continue trazodone          Subjective:      Patient ID: Prashanth Dodge is a 66 y o  male  HPI    BPH - reports continued incontinence despite tamsulosin/finasteride    Rosacea - continues with metronidazole PRN  Does not have dermatologist now  Insomnia - reports doing well with trazodone  The following portions of the patient's history were reviewed and updated as appropriate: allergies, current medications, past family history, past medical history, past social history, past surgical history and problem list     Review of Systems      Objective:    /82 (BP Location: Left arm, Patient Position: Sitting, Cuff Size: Adult)   Pulse 68   Temp 97 9 °F (36 6 °C) (Temporal)   Resp 16   Ht 6' 1" (1 854 m)   Wt 125 kg (275 lb 3 2 oz)   SpO2 94%   BMI 36 31 kg/m²      Physical Exam  Constitutional:       General: He is not in acute distress  HENT:      Head: Normocephalic and atraumatic  Right Ear: External ear normal       Left Ear: External ear normal       Nose: Nose normal    Eyes:      General: No scleral icterus  Conjunctiva/sclera: Conjunctivae normal    Pulmonary:      Effort: Pulmonary effort is normal  No respiratory distress  Abdominal:      General: There is no distension  Skin:     Coloration: Skin is not jaundiced or pale  Neurological:      General: No focal deficit present  Mental Status: He is alert  Psychiatric:         Mood and Affect: Mood normal          Thought Content:  Thought content normal

## 2022-10-03 NOTE — PROGRESS NOTES
Problem List Items Addressed This Visit        Other    Benign prostatic hyperplasia with urinary frequency - Primary (Chronic)    Prostate cancer screening encounter, options and risks discussed      Other Visit Diagnoses     Urgency of urination        Relevant Medications    tolterodine (DETROL LA) 4 mg 24 hr capsule            Discussion:    Tamiko Reid did well during his consultation today  He has been on dual medical therapy with tamsulosin and finasteride for roughly 10 months or so now  This did initially help with his symptoms but he continues with bothersome urgency and frequency of urination  I spoke with him about a trial of an anticholinergic medication we talked about the risks and benefits of this medication including potential side effects such as dry mouth and constipation  This has been sent to his pharmacy  We will assess him with a postvoid residual urine volume and AUA symptom score in 4 weeks to see how well he is tolerating this medication  From a prostate cancer screening perspective he is low risk, PSA is 0 4, this was taken prior to initiation of finasteride as such does not need to be doubled  His prostate is roughly 25-30 grams and smooth without nodules on a digital rectal exam     While he denies snoring he is tired during the day  I do recommend he consider referral by his primary care doctor for a sleep study to look for as yet undiagnosed sleep disordered breathing  He does have nocturia 3 times per night or so which is 1 of his more bothersome symptoms  I did speak with him about the next steps in workup which would include cystoscopy and transrectal ultrasound in terms of workup for potential treatment of the bladder outlet and other therapies such as cystoscopy with botulinum toxin injection or percutaneous tibial nerve stimulation or InterStim placement  All questions and concerns answered and addressed    He will return in 4 weeks    Assessment and plan: Please see problem oriented charting for the assessment plan of today's urological complaints      Gailmarcelino Ordazdana Shen      Chief Complaint     Chief Complaint   Patient presents with    Follow-up         History of Present Illness     Alvaro Heath is a 74 y o  gentleman presenting today for evaluation of lower urinary tract symptoms as referred by Dr Radha Torres consultation has been sent to the referring provider  With regard to this complaint it is localized to the prostate and urinary bladder  The quality is described as urgency and frequency of urination with decrease in his urinary stream and nocturia 3 times per night and the severity of this complaint is described as moderate  These symptoms have been present for months and the timing is ongoing  Previous treatments include tamsulosin and finasteride and previous work-up includes history and physical examination by his primary care provider  The patient mentions nothing and, initially, his prostate medications as aggravating and alleviating factors, respectively  The following associated signs and symptoms are mentioned:  None  The following portions of the patient's history were reviewed and updated as appropriate: allergies, current medications, past family history, past medical history, past social history, past surgical history and problem list         Detailed Urologic History     - please refer to HPI    Review of Systems     Review of Systems   Constitutional: Negative  HENT: Negative  Eyes: Negative  Respiratory: Negative  Cardiovascular: Negative  Gastrointestinal: Negative  Endocrine: Negative  Genitourinary: Positive for frequency and urgency  Musculoskeletal: Negative  Skin: Negative  Allergic/Immunologic: Negative  Neurological: Negative  Hematological: Negative  Psychiatric/Behavioral: Negative          AUA SYMPTOM SCORE    Flowsheet Row Most Recent Value   AUA SYMPTOM SCORE    How often have you had a sensation of not emptying your bladder completely after you finished urinating? 3 (P)     How often have you had to urinate again less than two hours after you finished urinating? 4 (P)     How often have you found you stopped and started again several times when you urinate? 3 (P)     How often have you found it difficult to postpone urination? 4 (P)     How often have you had a weak urinary stream? 4 (P)     How often have you had to push or strain to begin urination? 2 (P)     How many times did you most typically get up to urinate from the time you went to bed at night until the time you got up in the morning? 3 (P)     Quality of Life: If you were to spend the rest of your life with your urinary condition just the way it is now, how would you feel about that? 4 (P)     AUA SYMPTOM SCORE 23 (P)             Allergies     No Known Allergies    Physical Exam     Physical Exam  Vitals reviewed  Constitutional:       General: He is not in acute distress  Appearance: Normal appearance  He is not ill-appearing, toxic-appearing or diaphoretic  HENT:      Head: Normocephalic and atraumatic  Eyes:      General: No scleral icterus  Right eye: No discharge  Left eye: No discharge  Cardiovascular:      Pulses: Normal pulses  Pulmonary:      Effort: Pulmonary effort is normal    Abdominal:      General: There is no distension  Palpations: There is no mass  Musculoskeletal:         General: No swelling  Skin:     General: Skin is warm  Neurological:      General: No focal deficit present  Mental Status: He is alert and oriented to person, place, and time  Cranial Nerves: No cranial nerve deficit  Psychiatric:         Mood and Affect: Mood normal          Behavior: Behavior normal          Thought Content:  Thought content normal          Judgment: Judgment normal              Vital Signs  Vitals:    10/04/22 0921   BP: 138/70   BP Location: Left arm   Patient Position: Sitting   Cuff Size: Large   Pulse: 88   Resp: 16   SpO2: 97%   Weight: 125 kg (276 lb 9 6 oz)   Height: 6' 1" (1 854 m)         Current Medications       Current Outpatient Medications:     aspirin (ECOTRIN LOW STRENGTH) 81 mg EC tablet, Take 81 mg by mouth daily, Disp: , Rfl:     bepotastine besilate (BEPREVE) 1 5 % op soln, Administer 1 drop to both eyes 2 (two) times a day as needed , Disp: , Rfl:     betamethasone, augmented, (DIPROLENE) 0 05 % ointment, Apply 1 application topically daily as needed For rosacea , Disp: , Rfl:     Cholecalciferol 50 MCG (2000 UT) CAPS, Take 2,000 Units by mouth daily, Disp: , Rfl:     esomeprazole (NexIUM) 40 MG capsule, Take 40 mg by mouth daily, Disp: , Rfl:     finasteride (PROSCAR) 5 mg tablet, Take 5 mg by mouth daily, Disp: , Rfl:     gabapentin (NEURONTIN) 300 mg capsule, , Disp: , Rfl:     irbesartan (AVAPRO) 300 mg tablet, Take 1 tablet by mouth Daily , Disp: , Rfl:     meloxicam (MOBIC) 15 mg tablet, Take 15 mg by mouth daily, Disp: , Rfl:     metoprolol succinate (TOPROL-XL) 100 mg 24 hr tablet, Take 100 mg by mouth daily, Disp: , Rfl:     metroNIDAZOLE (METROGEL) 1 % gel, Apply 1 application topically daily as needed, Disp: , Rfl:     Multiple Vitamins-Minerals (PreserVision AREDS) CAPS, Take 1 capsule by mouth daily , Disp: , Rfl:     rosuvastatin (CRESTOR) 10 MG tablet, Take 10 mg by mouth daily, Disp: , Rfl:     tamsulosin (FLOMAX) 0 4 mg, Take 1 capsule (0 4 mg total) by mouth daily with dinner, Disp: 90 capsule, Rfl: 3    tolterodine (DETROL LA) 4 mg 24 hr capsule, Take 1 capsule (4 mg total) by mouth daily, Disp: 30 capsule, Rfl: 0    traZODone (DESYREL) 100 mg tablet, Take 1 tablet (100 mg total) by mouth daily at bedtime, Disp: 90 tablet, Rfl: 3    Xiidra 5 % op solution, Administer 1 drop to both eyes 2 (two) times a day , Disp: , Rfl:       Active Problems     Patient Active Problem List   Diagnosis    Irritable bowel syndrome with diarrhea    Mixed hyperlipidemia    Essential hypertension    Benign prostatic hyperplasia with urinary frequency    Duodenitis determined by biopsy    Seborrheic dermatitis    Small intestinal bacterial overgrowth (SIBO)    Choledochocele    Class 2 obesity due to excess calories without serious comorbidity with body mass index (BMI) of 35 0 to 35 9 in adult    Allergic conjunctivitis of both eyes    Need for vaccination    Adenomatous polyp of transverse colon    Arthritis    Costochondral pain    Primary insomnia    Rosacea    Prostate cancer screening encounter, options and risks discussed         Past Medical History     Past Medical History:   Diagnosis Date    Adenomatous polyp of transverse colon 12/9/2021    10/26/20 Tubular adenoma with focal high grade dysplasia   Also transverse tubular adenoma, Splenic flexure tubular adenoma    Arthritis     Hyperlipidemia     Hypertension          Surgical History     Past Surgical History:   Procedure Laterality Date    CHOLECYSTECTOMY  2001    EYE SURGERY           Family History     Family History   Problem Relation Age of Onset    Cancer Mother     Cancer Father          Social History     Social History     Social History     Tobacco Use   Smoking Status Never Smoker   Smokeless Tobacco Never Used         Pertinent Lab Values     No results found for: CREATININE        PSA 0 4 as of January of this year      Pertinent Imaging      No imaging for my review

## 2022-10-04 ENCOUNTER — TELEPHONE (OUTPATIENT)
Dept: UROLOGY | Facility: CLINIC | Age: 72
End: 2022-10-04

## 2022-10-04 ENCOUNTER — OFFICE VISIT (OUTPATIENT)
Dept: UROLOGY | Facility: CLINIC | Age: 72
End: 2022-10-04
Payer: MEDICARE

## 2022-10-04 VITALS
OXYGEN SATURATION: 97 % | WEIGHT: 276.6 LBS | SYSTOLIC BLOOD PRESSURE: 138 MMHG | BODY MASS INDEX: 36.66 KG/M2 | HEART RATE: 88 BPM | RESPIRATION RATE: 16 BRPM | DIASTOLIC BLOOD PRESSURE: 70 MMHG | HEIGHT: 73 IN

## 2022-10-04 DIAGNOSIS — R35.0 BENIGN PROSTATIC HYPERPLASIA WITH URINARY FREQUENCY: Primary | Chronic | ICD-10-CM

## 2022-10-04 DIAGNOSIS — R39.15 URGENCY OF URINATION: ICD-10-CM

## 2022-10-04 DIAGNOSIS — N40.1 BENIGN PROSTATIC HYPERPLASIA WITH URINARY FREQUENCY: Primary | Chronic | ICD-10-CM

## 2022-10-04 DIAGNOSIS — Z12.5 PROSTATE CANCER SCREENING ENCOUNTER, OPTIONS AND RISKS DISCUSSED: ICD-10-CM

## 2022-10-04 PROCEDURE — 99214 OFFICE O/P EST MOD 30 MIN: CPT | Performed by: UROLOGY

## 2022-10-04 RX ORDER — GABAPENTIN 300 MG/1
CAPSULE ORAL
COMMUNITY
Start: 2022-09-12

## 2022-10-04 RX ORDER — TOLTERODINE 4 MG/1
CAPSULE, EXTENDED RELEASE ORAL
Qty: 90 CAPSULE | Refills: 0 | Status: SHIPPED | OUTPATIENT
Start: 2022-10-04 | End: 2022-11-03

## 2022-10-04 RX ORDER — TOLTERODINE 4 MG/1
4 CAPSULE, EXTENDED RELEASE ORAL DAILY
Qty: 30 CAPSULE | Refills: 0 | Status: SHIPPED | OUTPATIENT
Start: 2022-10-04 | End: 2022-10-04

## 2022-10-04 NOTE — TELEPHONE ENCOUNTER
Patient needs a follow up in 4 weeks  He is only requesting Dr Ann Marie Morales but I told him Dr Ann Marie Morales is limited on office hours due to surgeries  Patient would prefer the [facility] office since it is closer to him  Can anything be arranged for patient in 4 weeks with Dr Ann Marie Morales in [facility]? Provider note:    Return in about 4 weeks (around 11/1/2022)

## 2022-10-05 NOTE — TELEPHONE ENCOUNTER
LM FOR PT TO CALL BACK TO SCHED APPT, UNFORTUNATELY THERE IS NOTHING AVAILABLE FOR DR KAUFFMAN IN [place] WITHIN THE NEXT FEW MONTHS, PT DOES HAVE TO FOLLOW UP WITH AP, IF HE INSISTS TO SEE DR KAUFFMAN IN [place] THE WAIT WOULD BE LONGER THAN 4 WEEKS RECOMMENDED

## 2022-10-07 NOTE — TELEPHONE ENCOUNTER
Is patient okay to be scheduled out more than 4 weeks with Dr Krishan Dodge as he is only requesting to see him?

## 2022-10-07 NOTE — TELEPHONE ENCOUNTER
Pt called and he does not want to see anyone but Dr Tila Santo  He states that he would like to go to [facility] but will go to [place] if have to       Please review and advice    Pt can be reached at [phone]

## 2022-10-11 NOTE — TELEPHONE ENCOUNTER
Next available appt with Dr Vinny Márquez is 11/10/22 at 9:45 Am in the [facility] office   Pt confirmed

## 2022-10-12 PROBLEM — H10.13 ALLERGIC CONJUNCTIVITIS OF BOTH EYES: Status: RESOLVED | Noted: 2021-12-09 | Resolved: 2022-10-12

## 2022-11-10 ENCOUNTER — TELEPHONE (OUTPATIENT)
Dept: UROLOGY | Facility: AMBULATORY SURGERY CENTER | Age: 72
End: 2022-11-10

## 2022-11-10 NOTE — TELEPHONE ENCOUNTER
This patient cancelled appt today with Dr Kelly Jones  For reschedule, does this appointment have to be with Dr Kelly Jones or can it be with an AP?

## 2022-11-10 NOTE — TELEPHONE ENCOUNTER
Pt cancelled f/u appointment and he only wants to see Dr Mari Patino  No  Appointments available to reschedule him to  Please review and advice pt when he can see him       Pt can be reached at [phone]

## 2022-11-10 NOTE — TELEPHONE ENCOUNTER
PT ONLY WANTED TO SEE DV SO I HAD TO MAKE HIM WAIT TILL 1/13/2023  I ADVISED HIM TO SEE A PA SINCE HIS F/U WAS SUPPOSE TO BE 4 WEEKS BUT AT THIS TIME HE WANTED TO WAIT AND IF HE CHANGES HIS MIND THEN HE WILL CALL BACK AND SCHEDULE WITH A PA

## 2022-12-03 PROBLEM — Z12.5 PROSTATE CANCER SCREENING ENCOUNTER, OPTIONS AND RISKS DISCUSSED: Status: RESOLVED | Noted: 2022-10-04 | Resolved: 2022-12-03

## 2022-12-30 DIAGNOSIS — R39.15 URGENCY OF URINATION: ICD-10-CM

## 2022-12-30 RX ORDER — TOLTERODINE 4 MG/1
CAPSULE, EXTENDED RELEASE ORAL
Qty: 90 CAPSULE | Refills: 0 | Status: SHIPPED | OUTPATIENT
Start: 2022-12-30 | End: 2023-01-29

## 2023-01-13 ENCOUNTER — OFFICE VISIT (OUTPATIENT)
Dept: UROLOGY | Facility: CLINIC | Age: 73
End: 2023-01-13

## 2023-01-13 VITALS
HEIGHT: 73 IN | WEIGHT: 212.6 LBS | BODY MASS INDEX: 28.18 KG/M2 | RESPIRATION RATE: 16 BRPM | OXYGEN SATURATION: 98 % | DIASTOLIC BLOOD PRESSURE: 70 MMHG | SYSTOLIC BLOOD PRESSURE: 110 MMHG | HEART RATE: 93 BPM

## 2023-01-13 DIAGNOSIS — R35.0 BENIGN PROSTATIC HYPERPLASIA WITH URINARY FREQUENCY: Primary | Chronic | ICD-10-CM

## 2023-01-13 DIAGNOSIS — Z79.899 MEDICATION MANAGEMENT: ICD-10-CM

## 2023-01-13 DIAGNOSIS — Z71.2 ENCOUNTER TO DISCUSS TEST RESULTS: ICD-10-CM

## 2023-01-13 DIAGNOSIS — N40.1 BENIGN PROSTATIC HYPERPLASIA WITH URINARY FREQUENCY: Primary | Chronic | ICD-10-CM

## 2023-01-13 LAB — POST-VOID RESIDUAL VOLUME, ML POC: 138 ML

## 2023-01-13 RX ORDER — TRIAMTERENE AND HYDROCHLOROTHIAZIDE 37.5; 25 MG/1; MG/1
CAPSULE ORAL
COMMUNITY

## 2023-01-13 NOTE — PROGRESS NOTES
Problem List Items Addressed This Visit        Other    Benign prostatic hyperplasia with urinary frequency - Primary (Chronic)    Relevant Orders    POCT Measure PVR (Completed)    Encounter to discuss test results    Medication management       Discussion:    Doing well with the antimuscarinic medication, continues on tamsulosin and finasteride, does not want a rectal exam today    Continue trimodal therapy, will see us in 1 year  Assessment and plan:       Please see problem oriented charting for the assessment plan of today's urological complaints      Flaco Cheung MD      Chief Complaint     Chief Complaint   Patient presents with   • Follow-up         History of Present Illness     Reuben Holcomb is a 74 y o  man with bph and luts, on tamsulosin, finasteride, and detrol, doing much better with his LUTS recently  No interest in surgery at this time, does not want surgery for LUTS, can see us in 1 year    No new complaints    The following portions of the patient's history were reviewed and updated as appropriate: allergies, current medications, past family history, past medical history, past social history, past surgical history and problem list       Detailed Urologic History     - please refer to HPI    Review of Systems     Review of Systems   Constitutional: Negative  HENT: Negative  Eyes: Negative  Respiratory: Negative  Cardiovascular: Negative  Gastrointestinal: Negative  Endocrine: Negative  Genitourinary: Positive for frequency and urgency  Musculoskeletal: Negative  Skin: Negative  Allergic/Immunologic: Negative  Neurological: Negative  Hematological: Negative  Psychiatric/Behavioral: Negative          AUA SYMPTOM SCORE    Flowsheet Row Most Recent Value   AUA SYMPTOM SCORE    How often have you had a sensation of not emptying your bladder completely after you finished urinating? 2 (P)     How often have you had to urinate again less than two hours after you finished urinating? 2 (P)     How often have you found you stopped and started again several times when you urinate? 2 (P)     How often have you found it difficult to postpone urination? 4 (P)     How often have you had a weak urinary stream? 3 (P)     How often have you had to push or strain to begin urination? 2 (P)     How many times did you most typically get up to urinate from the time you went to bed at night until the time you got up in the morning? 3 (P)     Quality of Life: If you were to spend the rest of your life with your urinary condition just the way it is now, how would you feel about that? 5 (P)     AUA SYMPTOM SCORE 18 (P)             Allergies     No Known Allergies    Physical Exam     Physical Exam  Vitals reviewed  Constitutional:       General: He is not in acute distress  Appearance: Normal appearance  He is obese  He is not ill-appearing, toxic-appearing or diaphoretic  HENT:      Head: Normocephalic and atraumatic  Eyes:      General: No scleral icterus  Right eye: No discharge  Left eye: No discharge  Pulmonary:      Effort: Pulmonary effort is normal    Abdominal:      General: There is no distension  Palpations: There is no mass  Musculoskeletal:         General: No swelling  Skin:     Coloration: Skin is not jaundiced  Neurological:      General: No focal deficit present  Mental Status: He is alert and oriented to person, place, and time  Cranial Nerves: No cranial nerve deficit  Psychiatric:         Mood and Affect: Mood normal          Behavior: Behavior normal          Thought Content:  Thought content normal          Judgment: Judgment normal              Vital Signs  Vitals:    01/13/23 1540   BP: 110/70   BP Location: Left arm   Patient Position: Sitting   Cuff Size: Large   Pulse: 93   Resp: 16   SpO2: 98%   Weight: 96 4 kg (212 lb 9 6 oz)   Height: 6' 1" (1 854 m)         Current Medications       Current Outpatient Medications:   •  aspirin (ECOTRIN LOW STRENGTH) 81 mg EC tablet, Take 81 mg by mouth daily, Disp: , Rfl:   •  bepotastine besilate (BEPREVE) 1 5 % op soln, Administer 1 drop to both eyes 2 (two) times a day as needed , Disp: , Rfl:   •  betamethasone, augmented, (DIPROLENE) 0 05 % ointment, Apply 1 application topically daily as needed For rosacea , Disp: , Rfl:   •  Cholecalciferol 50 MCG (2000 UT) CAPS, Take 2,000 Units by mouth daily, Disp: , Rfl:   •  esomeprazole (NexIUM) 40 MG capsule, Take 40 mg by mouth daily, Disp: , Rfl:   •  finasteride (PROSCAR) 5 mg tablet, Take 5 mg by mouth daily, Disp: , Rfl:   •  gabapentin (NEURONTIN) 300 mg capsule, , Disp: , Rfl:   •  irbesartan (AVAPRO) 300 mg tablet, Take 1 tablet by mouth Daily , Disp: , Rfl:   •  meloxicam (MOBIC) 15 mg tablet, Take 15 mg by mouth daily, Disp: , Rfl:   •  metoprolol succinate (TOPROL-XL) 100 mg 24 hr tablet, Take 100 mg by mouth daily, Disp: , Rfl:   •  metroNIDAZOLE (METROGEL) 1 % gel, Apply 1 application topically daily as needed, Disp: , Rfl:   •  Multiple Vitamins-Minerals (PreserVision AREDS) CAPS, Take 1 capsule by mouth daily , Disp: , Rfl:   •  rosuvastatin (CRESTOR) 10 MG tablet, Take 10 mg by mouth daily, Disp: , Rfl:   •  tamsulosin (FLOMAX) 0 4 mg, Take 1 capsule (0 4 mg total) by mouth daily with dinner, Disp: 90 capsule, Rfl: 3  •  tolterodine (DETROL LA) 4 mg 24 hr capsule, TAKE 1 CAPSULE(4 MG) BY MOUTH DAILY, Disp: 90 capsule, Rfl: 0  •  traZODone (DESYREL) 100 mg tablet, Take 1 tablet (100 mg total) by mouth daily at bedtime, Disp: 90 tablet, Rfl: 3  •  triamterene-hydrochlorothiazide (DYAZIDE) 37 5-25 mg per capsule, triamterene 37 5 mg-hydrochlorothiazide 25 mg capsule  TAKE 1 CAPSULE BY MOUTH EVERY DAY, Disp: , Rfl:   •  Xiidra 5 % op solution, Administer 1 drop to both eyes 2 (two) times a day , Disp: , Rfl:       Active Problems     Patient Active Problem List   Diagnosis   • Irritable bowel syndrome with diarrhea   • Mixed hyperlipidemia   • Essential hypertension   • Benign prostatic hyperplasia with urinary frequency   • Duodenitis determined by biopsy   • Seborrheic dermatitis   • Small intestinal bacterial overgrowth (SIBO)   • Choledochocele   • Class 2 obesity due to excess calories without serious comorbidity with body mass index (BMI) of 35 0 to 35 9 in adult   • Need for vaccination   • Adenomatous polyp of transverse colon   • Arthritis   • Costochondral pain   • Primary insomnia   • Rosacea   • Encounter to discuss test results   • Medication management         Past Medical History     Past Medical History:   Diagnosis Date   • Adenomatous polyp of transverse colon 12/9/2021    10/26/20 Tubular adenoma with focal high grade dysplasia   Also transverse tubular adenoma, Splenic flexure tubular adenoma   • Arthritis    • Hyperlipidemia    • Hypertension          Surgical History     Past Surgical History:   Procedure Laterality Date   • CHOLECYSTECTOMY  2001   • EYE SURGERY           Family History     Family History   Problem Relation Age of Onset   • Cancer Mother    • Cancer Father          Social History     Social History     Social History     Tobacco Use   Smoking Status Never   Smokeless Tobacco Never         Pertinent Lab Values     No results found for: CREATININE    No results found for: PSA     mL      Pertinent Imaging      no new imaging for my review

## 2023-02-21 ENCOUNTER — APPOINTMENT (OUTPATIENT)
Dept: LAB | Facility: MEDICAL CENTER | Age: 73
End: 2023-02-21

## 2023-02-21 DIAGNOSIS — N52.9 ERECTILE DYSFUNCTION, UNSPECIFIED ERECTILE DYSFUNCTION TYPE: ICD-10-CM

## 2023-02-21 DIAGNOSIS — Z79.899 ENCOUNTER FOR LONG-TERM (CURRENT) USE OF OTHER MEDICATIONS: ICD-10-CM

## 2023-02-21 DIAGNOSIS — E55.9 VITAMIN D DEFICIENCY DISEASE: ICD-10-CM

## 2023-02-21 DIAGNOSIS — E78.00 PURE HYPERCHOLESTEROLEMIA: ICD-10-CM

## 2023-02-21 DIAGNOSIS — R53.83 FATIGUE, UNSPECIFIED TYPE: ICD-10-CM

## 2023-02-21 DIAGNOSIS — E07.9 DISEASE OF THYROID GLAND: ICD-10-CM

## 2023-02-21 DIAGNOSIS — E87.8 DILATED CARDIOMYOPATHY SECONDARY TO ELECTROLYTE DEFICIENCY (HCC): ICD-10-CM

## 2023-02-21 DIAGNOSIS — R73.01 IMPAIRED FASTING GLUCOSE: ICD-10-CM

## 2023-02-21 DIAGNOSIS — R00.2 PALPITATIONS: ICD-10-CM

## 2023-02-21 DIAGNOSIS — N40.0 BENIGN PROSTATIC HYPERPLASIA, UNSPECIFIED WHETHER LOWER URINARY TRACT SYMPTOMS PRESENT: ICD-10-CM

## 2023-02-21 DIAGNOSIS — I10 ESSENTIAL HYPERTENSION, BENIGN: ICD-10-CM

## 2023-02-21 DIAGNOSIS — I43 DILATED CARDIOMYOPATHY SECONDARY TO ELECTROLYTE DEFICIENCY (HCC): ICD-10-CM

## 2023-02-21 DIAGNOSIS — R74.8 ACID PHOSPHATASE ELEVATED: ICD-10-CM

## 2023-02-21 DIAGNOSIS — R94.5 NONSPECIFIC ABNORMAL RESULTS OF LIVER FUNCTION STUDY: ICD-10-CM

## 2023-02-21 DIAGNOSIS — E78.5 HYPERLIPIDEMIA, UNSPECIFIED HYPERLIPIDEMIA TYPE: ICD-10-CM

## 2023-02-21 DIAGNOSIS — E11.9 DIABETES MELLITUS WITHOUT COMPLICATION (HCC): ICD-10-CM

## 2023-02-21 LAB
25(OH)D3 SERPL-MCNC: 32.6 NG/ML (ref 30–100)
ALBUMIN SERPL BCP-MCNC: 4 G/DL (ref 3.5–5)
ALP SERPL-CCNC: 58 U/L (ref 46–116)
ALT SERPL W P-5'-P-CCNC: 59 U/L (ref 12–78)
ANION GAP SERPL CALCULATED.3IONS-SCNC: 4 MMOL/L (ref 4–13)
AST SERPL W P-5'-P-CCNC: 24 U/L (ref 5–45)
BASOPHILS # BLD AUTO: 0.03 THOUSANDS/ÂΜL (ref 0–0.1)
BASOPHILS NFR BLD AUTO: 1 % (ref 0–1)
BILIRUB SERPL-MCNC: 0.57 MG/DL (ref 0.2–1)
BUN SERPL-MCNC: 21 MG/DL (ref 5–25)
CALCIUM SERPL-MCNC: 9.7 MG/DL (ref 8.3–10.1)
CHLORIDE SERPL-SCNC: 107 MMOL/L (ref 96–108)
CHOLEST SERPL-MCNC: 130 MG/DL
CK SERPL-CCNC: 48 U/L (ref 39–308)
CO2 SERPL-SCNC: 26 MMOL/L (ref 21–32)
CREAT SERPL-MCNC: 1.15 MG/DL (ref 0.6–1.3)
EOSINOPHIL # BLD AUTO: 0.2 THOUSAND/ÂΜL (ref 0–0.61)
EOSINOPHIL NFR BLD AUTO: 3 % (ref 0–6)
ERYTHROCYTE [DISTWIDTH] IN BLOOD BY AUTOMATED COUNT: 13.5 % (ref 11.6–15.1)
GFR SERPL CREATININE-BSD FRML MDRD: 62 ML/MIN/1.73SQ M
GLUCOSE P FAST SERPL-MCNC: 102 MG/DL (ref 65–99)
HCT VFR BLD AUTO: 43.4 % (ref 36.5–49.3)
HDLC SERPL-MCNC: 46 MG/DL
HGB BLD-MCNC: 15 G/DL (ref 12–17)
IMM GRANULOCYTES # BLD AUTO: 0.02 THOUSAND/UL (ref 0–0.2)
IMM GRANULOCYTES NFR BLD AUTO: 0 % (ref 0–2)
LDLC SERPL CALC-MCNC: 58 MG/DL (ref 0–100)
LDLC SERPL DIRECT ASSAY-MCNC: 64 MG/DL (ref 0–100)
LYMPHOCYTES # BLD AUTO: 1.94 THOUSANDS/ÂΜL (ref 0.6–4.47)
LYMPHOCYTES NFR BLD AUTO: 30 % (ref 14–44)
MAGNESIUM SERPL-MCNC: 2.3 MG/DL (ref 1.6–2.6)
MCH RBC QN AUTO: 33.5 PG (ref 26.8–34.3)
MCHC RBC AUTO-ENTMCNC: 34.6 G/DL (ref 31.4–37.4)
MCV RBC AUTO: 97 FL (ref 82–98)
MONOCYTES # BLD AUTO: 0.54 THOUSAND/ÂΜL (ref 0.17–1.22)
MONOCYTES NFR BLD AUTO: 8 % (ref 4–12)
NEUTROPHILS # BLD AUTO: 3.85 THOUSANDS/ÂΜL (ref 1.85–7.62)
NEUTS SEG NFR BLD AUTO: 58 % (ref 43–75)
NONHDLC SERPL-MCNC: 84 MG/DL
NRBC BLD AUTO-RTO: 0 /100 WBCS
PLATELET # BLD AUTO: 160 THOUSANDS/UL (ref 149–390)
PMV BLD AUTO: 10.3 FL (ref 8.9–12.7)
POTASSIUM SERPL-SCNC: 4.2 MMOL/L (ref 3.5–5.3)
PROT SERPL-MCNC: 6.9 G/DL (ref 6.4–8.4)
PSA SERPL-MCNC: 0.4 NG/ML (ref 0–4)
RBC # BLD AUTO: 4.48 MILLION/UL (ref 3.88–5.62)
SODIUM SERPL-SCNC: 137 MMOL/L (ref 135–147)
T3 SERPL-MCNC: 1 NG/ML (ref 0.6–1.8)
T4 FREE SERPL-MCNC: 0.99 NG/DL (ref 0.76–1.46)
T4 SERPL-MCNC: 9.5 UG/DL (ref 4.7–13.3)
TRIGL SERPL-MCNC: 131 MG/DL
TSH SERPL DL<=0.05 MIU/L-ACNC: 2.97 UIU/ML (ref 0.45–4.5)
WBC # BLD AUTO: 6.58 THOUSAND/UL (ref 4.31–10.16)

## 2023-02-22 LAB — T3RU NFR SERPL: 29 % (ref 24–39)

## 2023-02-23 LAB
EST. AVERAGE GLUCOSE BLD GHB EST-MCNC: 108 MG/DL
HBA1C MFR BLD: 5.4 %

## 2023-03-07 ENCOUNTER — PATIENT MESSAGE (OUTPATIENT)
Dept: UROLOGY | Facility: CLINIC | Age: 73
End: 2023-03-07

## 2023-03-07 DIAGNOSIS — R39.15 URGENCY OF URINATION: ICD-10-CM

## 2023-03-07 RX ORDER — TOLTERODINE 4 MG/1
4 CAPSULE, EXTENDED RELEASE ORAL DAILY
Qty: 90 CAPSULE | Refills: 3 | Status: SHIPPED | OUTPATIENT
Start: 2023-03-07 | End: 2023-04-06

## 2023-03-13 ENCOUNTER — APPOINTMENT (OUTPATIENT)
Dept: LAB | Facility: HOSPITAL | Age: 73
End: 2023-03-13

## 2023-03-13 ENCOUNTER — NURSE TRIAGE (OUTPATIENT)
Dept: OTHER | Facility: OTHER | Age: 73
End: 2023-03-13

## 2023-03-13 DIAGNOSIS — R39.15 URGENCY OF URINATION: Primary | ICD-10-CM

## 2023-03-13 DIAGNOSIS — R39.9 UTI SYMPTOMS: ICD-10-CM

## 2023-03-13 DIAGNOSIS — N39.0 URINARY TRACT INFECTION WITHOUT HEMATURIA, SITE UNSPECIFIED: Primary | ICD-10-CM

## 2023-03-13 LAB
BACTERIA UR QL AUTO: ABNORMAL /HPF
BILIRUB UR QL STRIP: NEGATIVE
CLARITY UR: ABNORMAL
COLOR UR: YELLOW
GLUCOSE UR STRIP-MCNC: NEGATIVE MG/DL
HGB UR QL STRIP.AUTO: ABNORMAL
KETONES UR STRIP-MCNC: NEGATIVE MG/DL
LEUKOCYTE ESTERASE UR QL STRIP: ABNORMAL
MUCOUS THREADS UR QL AUTO: ABNORMAL
NITRITE UR QL STRIP: POSITIVE
NON-SQ EPI CELLS URNS QL MICRO: ABNORMAL /HPF
PH UR STRIP.AUTO: 6 [PH]
PROT UR STRIP-MCNC: ABNORMAL MG/DL
RBC #/AREA URNS AUTO: ABNORMAL /HPF
SP GR UR STRIP.AUTO: 1.03 (ref 1–1.03)
UROBILINOGEN UR STRIP-ACNC: 2 MG/DL
WBC #/AREA URNS AUTO: ABNORMAL /HPF
WBC CLUMPS # UR AUTO: PRESENT /UL

## 2023-03-13 RX ORDER — CEPHALEXIN 500 MG/1
500 CAPSULE ORAL EVERY 12 HOURS SCHEDULED
Qty: 14 CAPSULE | Refills: 0 | Status: SHIPPED | OUTPATIENT
Start: 2023-03-13 | End: 2023-03-20

## 2023-03-13 NOTE — TELEPHONE ENCOUNTER
started Friday with burning with urination and frequency  Awaiting UA C&S results that were ordered this morning  Patient notes symptoms are worsening- reports mild abdominal pain, right flank pain, burning with urination as well as bleeding  Also notes urinating very small amounts every 30 minutes  Denies fever  Questioning if abx can be prescribed       On call provider notified

## 2023-03-13 NOTE — TELEPHONE ENCOUNTER
Reason for Disposition  • Urinating more frequently than usual (i e , frequency)    Answer Assessment - Initial Assessment Questions  1  SYMPTOM: "What's the main symptom you're concerned about?" (e g , frequency, incontinence)      Urinary frequency  2  ONSET: "When did the  frequency  start?"      Friday  3  PAIN: "Is there any pain?" If Yes, ask: "How bad is it?" (Scale: 1-10; mild, moderate, severe)      mild  4  CAUSE: "What do you think is causing the symptoms?"      unsure  5   OTHER SYMPTOMS: "Do you have any other symptoms?" (e g , fever, flank pain, blood in urine, pain with urination)      Blood in urine, flank pain, pain with urination, abdominal pain    Protocols used: [facility]

## 2023-03-13 NOTE — TELEPHONE ENCOUNTER
Regarding: UTI requesting prescription  ----- Message from Rosa Shetty RN sent at 3/13/2023  4:22 PM EDT -----  I have a UTI and I keep calling the office to get the prescription and I am not receiving it  I did the urine sample this morning

## 2023-03-15 ENCOUNTER — TELEPHONE (OUTPATIENT)
Dept: OTHER | Facility: OTHER | Age: 73
End: 2023-03-15

## 2023-03-15 DIAGNOSIS — R35.0 BENIGN PROSTATIC HYPERPLASIA WITH URINARY FREQUENCY: ICD-10-CM

## 2023-03-15 DIAGNOSIS — N40.1 BENIGN PROSTATIC HYPERPLASIA WITH URINARY FREQUENCY: ICD-10-CM

## 2023-03-15 LAB — BACTERIA UR CULT: ABNORMAL

## 2023-03-15 RX ORDER — TAMSULOSIN HYDROCHLORIDE 0.4 MG/1
0.4 CAPSULE ORAL
Qty: 90 CAPSULE | Refills: 3 | Status: SHIPPED | OUTPATIENT
Start: 2023-03-15

## 2023-03-15 RX ORDER — TAMSULOSIN HYDROCHLORIDE 0.4 MG/1
0.4 CAPSULE ORAL
Qty: 90 CAPSULE | Refills: 3 | Status: CANCELLED | OUTPATIENT
Start: 2023-03-15

## 2023-03-15 NOTE — TELEPHONE ENCOUNTER
Patient states he still has a lot of blood in he urine and discomfort  Waiting to review the urine culture results

## 2023-03-15 NOTE — TELEPHONE ENCOUNTER
Spoke with patient and informed him per final urine culture results, he is on the appropriate antibiotic  Patient states he is still feeling pain in the lower R back and dark fruit punch colored urine  Advised patient an antibiotic usually takes 48-72 hr to take effect  It is only 2 days he is currently on it  Suggested to patient completing the antibiotic, and if he is still experiencing lower back pain and hematuria with not relief, will address with triage provider  He also is asking for refill of Flomax  Order pended in chart

## 2023-03-16 NOTE — TELEPHONE ENCOUNTER
[facility] 20 hours ago (2:11 PM)     Agree, antibiotic is susceptible  Hydrate  If not improving over the next few days then will get a CT  Flomax sent      Advised patient of the above  Patient reports a slight improvement in symptoms  Advised him to call office if there no improvement over the weekend or by Monday  Advised him to try a heating pad on back/ flank and see if that helps at all       Already picked up flomax refill

## 2023-06-16 ENCOUNTER — APPOINTMENT (OUTPATIENT)
Age: 73
End: 2023-06-16
Payer: MEDICARE

## 2023-06-16 DIAGNOSIS — R39.9 UTI SYMPTOMS: ICD-10-CM

## 2023-06-16 PROCEDURE — 87086 URINE CULTURE/COLONY COUNT: CPT

## 2023-06-16 PROCEDURE — 87186 SC STD MICRODIL/AGAR DIL: CPT

## 2023-06-16 PROCEDURE — 87077 CULTURE AEROBIC IDENTIFY: CPT

## 2023-06-18 LAB — BACTERIA UR CULT: ABNORMAL

## 2023-06-19 DIAGNOSIS — N39.0 URINARY TRACT INFECTION WITHOUT HEMATURIA, SITE UNSPECIFIED: Primary | ICD-10-CM

## 2023-06-19 RX ORDER — CEPHALEXIN 500 MG/1
500 CAPSULE ORAL EVERY 8 HOURS SCHEDULED
Qty: 21 CAPSULE | Refills: 0 | Status: SHIPPED | OUTPATIENT
Start: 2023-06-19 | End: 2023-06-26

## 2023-11-05 ENCOUNTER — PATIENT MESSAGE (OUTPATIENT)
Dept: UROLOGY | Facility: CLINIC | Age: 73
End: 2023-11-05

## 2023-11-06 ENCOUNTER — APPOINTMENT (OUTPATIENT)
Age: 73
End: 2023-11-06
Payer: MEDICARE

## 2023-11-06 DIAGNOSIS — R39.9 UTI SYMPTOMS: Primary | ICD-10-CM

## 2023-11-06 DIAGNOSIS — R39.9 UTI SYMPTOMS: ICD-10-CM

## 2023-11-06 LAB
BACTERIA UR QL AUTO: ABNORMAL /HPF
BILIRUB UR QL STRIP: NEGATIVE
BUDDING YEAST: PRESENT
CLARITY UR: ABNORMAL
COLOR UR: YELLOW
GLUCOSE UR STRIP-MCNC: NEGATIVE MG/DL
HGB UR QL STRIP.AUTO: ABNORMAL
KETONES UR STRIP-MCNC: NEGATIVE MG/DL
LEUKOCYTE ESTERASE UR QL STRIP: ABNORMAL
MUCOUS THREADS UR QL AUTO: ABNORMAL
NITRITE UR QL STRIP: POSITIVE
NON-SQ EPI CELLS URNS QL MICRO: ABNORMAL /HPF
PH UR STRIP.AUTO: 6 [PH]
PROT UR STRIP-MCNC: ABNORMAL MG/DL
RBC #/AREA URNS AUTO: ABNORMAL /HPF
SP GR UR STRIP.AUTO: 1.02 (ref 1–1.03)
UROBILINOGEN UR STRIP-ACNC: <2 MG/DL
WBC #/AREA URNS AUTO: ABNORMAL /HPF

## 2023-11-06 PROCEDURE — 81001 URINALYSIS AUTO W/SCOPE: CPT

## 2023-11-06 PROCEDURE — 87186 SC STD MICRODIL/AGAR DIL: CPT

## 2023-11-06 PROCEDURE — 87086 URINE CULTURE/COLONY COUNT: CPT

## 2023-11-06 PROCEDURE — 87077 CULTURE AEROBIC IDENTIFY: CPT

## 2023-11-07 ENCOUNTER — TELEPHONE (OUTPATIENT)
Age: 73
End: 2023-11-07

## 2023-11-07 DIAGNOSIS — R39.9 UTI SYMPTOMS: Primary | ICD-10-CM

## 2023-11-07 NOTE — TELEPHONE ENCOUNTER
Pt called and requesting call back with UA results and to have abx if needed sent to [address] [place] [id number]  Phone: [phone]  Fax: [phone]     Pt call [phone]

## 2023-11-08 DIAGNOSIS — N39.0 URINARY TRACT INFECTION WITHOUT HEMATURIA, SITE UNSPECIFIED: Primary | ICD-10-CM

## 2023-11-08 LAB — BACTERIA UR CULT: ABNORMAL

## 2023-11-08 RX ORDER — AMOXICILLIN AND CLAVULANATE POTASSIUM 875; 125 MG/1; MG/1
1 TABLET, FILM COATED ORAL EVERY 12 HOURS SCHEDULED
Qty: 14 TABLET | Refills: 0 | Status: SHIPPED | OUTPATIENT
Start: 2023-11-08 | End: 2023-11-15

## 2023-11-08 NOTE — TELEPHONE ENCOUNTER
PT returning missed call and message was relayed to pt but pt would still like to speak with clinical regarding this is his 3rd UTI     PT call back-[phone]

## 2023-11-08 NOTE — TELEPHONE ENCOUNTER
Patient has preliminary culture positive for E. coli. Based on last E. coli infection I have sent over empiric antibiotics. Please have patient start this and monitor for final results to ensure sensitivity.   Thank you

## 2023-11-08 NOTE — TELEPHONE ENCOUNTER
Patient states utis occur after periods of straining. He was doing yard work this past week. He wants to solve this issue as he feels like he can't keep taking antibiotics every 3 months. Reviewed we can schedule sooner follow up with advanced practitioner. Patient states he does not wish to see a female provider. Patient scheduled with Irina Solorzano in December, appt in March cancelled.

## 2023-11-08 NOTE — TELEPHONE ENCOUNTER
BHARTI left for patient     When patient calls back please let him know an abx was sent to his pharmacy. We are still waiting for his final UC.  Once the UC results we will call him back only if the medication needs to be changed

## 2023-11-16 ENCOUNTER — APPOINTMENT (OUTPATIENT)
Age: 73
End: 2023-11-16
Payer: COMMERCIAL

## 2023-11-16 DIAGNOSIS — R39.9 UTI SYMPTOMS: ICD-10-CM

## 2023-11-16 PROCEDURE — 87086 URINE CULTURE/COLONY COUNT: CPT

## 2023-11-16 PROCEDURE — 81001 URINALYSIS AUTO W/SCOPE: CPT

## 2023-11-16 NOTE — TELEPHONE ENCOUNTER
Patient called stating he still having blood in the urine and discomfort on his lower abdomen. He stated he did send a message via my chart yesterday and wants to know how to proceed. He finished his antibiotics and he is not sure if he will need more medication. He has a follow up appointment on 12/15 but not sure this can wait until the appointment. He would like a call back.      Patient can be reached at [phone]

## 2023-11-16 NOTE — TELEPHONE ENCOUNTER
Patient called, finished Antibiotic Augmentin last night as prescribed  for UTI 11/8/23. Patient still has the same discomforts as previously and feels infection did not clear. Sent patient for urine testing today. Denies fever, chills, pain. Patient is staying hydrated. Please advise    Answer Questions - Initial Assessment  When did your symptoms start: Past 3 months UTI's    Is burning with every void: yes- only after strenuous activity     Do you have any other urinary symptoms, urinary frequency, urgency, incontinence: no- nothing out of ordinary, per pt.     Have you become unable to urinate: No: I do not feel as though I am retaining urine    Have you seen blood in your urine: yes- 'only after strenuous activity, patient see's a tinge     Do you have any abdominal pain or flank pain: yes- per patient has chronic pancreatitis     Do you have a fever of 101 or higher: no    Do you have a history of urinary tract infections: Yes: 11/8    Have you had any recent urine testing: yes 11/8    Protocols used: Dysuria

## 2023-11-17 LAB
BACTERIA UR CULT: NORMAL
BACTERIA UR QL AUTO: ABNORMAL /HPF
BILIRUB UR QL STRIP: NEGATIVE
CLARITY UR: CLEAR
COLOR UR: YELLOW
GLUCOSE UR STRIP-MCNC: NEGATIVE MG/DL
HGB UR QL STRIP.AUTO: NEGATIVE
KETONES UR STRIP-MCNC: NEGATIVE MG/DL
LEUKOCYTE ESTERASE UR QL STRIP: NEGATIVE
MUCOUS THREADS UR QL AUTO: ABNORMAL
NITRITE UR QL STRIP: NEGATIVE
NON-SQ EPI CELLS URNS QL MICRO: ABNORMAL /HPF
PH UR STRIP.AUTO: 6 [PH]
PROT UR STRIP-MCNC: NEGATIVE MG/DL
RBC #/AREA URNS AUTO: ABNORMAL /HPF
SP GR UR STRIP.AUTO: 1.02 (ref 1–1.03)
UROBILINOGEN UR STRIP-ACNC: <2 MG/DL
WBC #/AREA URNS AUTO: ABNORMAL /HPF

## 2023-11-17 NOTE — TELEPHONE ENCOUNTER
Pt called stated that he had urine test done yesterday and wants to know if he has an infection or if he needs a medication.      Please review

## 2023-11-20 ENCOUNTER — TELEPHONE (OUTPATIENT)
Dept: UROLOGY | Facility: CLINIC | Age: 73
End: 2023-11-20

## 2023-11-20 NOTE — TELEPHONE ENCOUNTER
Called and spoke with pt. I relayed the message that his urine culture was negative. Pt stated some one else called 2 hrs prior to me calling him giving him the results and was upset he had called Friday 11/17/23 wanting to know if the results were in or still needed time.     ----- Message from Veronika Mims PA-C sent at 11/20/2023 10:29 AM EST -----  Urine culture negative

## 2023-11-20 NOTE — TELEPHONE ENCOUNTER
----- Message from Sandra Preston PA-C sent at 11/20/2023 10:29 AM EST -----  Urine culture negative

## 2023-12-13 ENCOUNTER — PATIENT MESSAGE (OUTPATIENT)
Dept: UROLOGY | Facility: CLINIC | Age: 73
End: 2023-12-13

## 2023-12-13 ENCOUNTER — APPOINTMENT (OUTPATIENT)
Age: 73
End: 2023-12-13
Payer: COMMERCIAL

## 2023-12-13 DIAGNOSIS — N39.0 URINARY TRACT INFECTION WITHOUT HEMATURIA, SITE UNSPECIFIED: ICD-10-CM

## 2023-12-13 DIAGNOSIS — R39.9 UTI SYMPTOMS: Primary | ICD-10-CM

## 2023-12-13 DIAGNOSIS — R39.9 UTI SYMPTOMS: ICD-10-CM

## 2023-12-13 LAB
BACTERIA UR QL AUTO: ABNORMAL /HPF
BILIRUB UR QL STRIP: NEGATIVE
CLARITY UR: CLEAR
COLOR UR: YELLOW
GLUCOSE UR STRIP-MCNC: NEGATIVE MG/DL
HGB UR QL STRIP.AUTO: NEGATIVE
KETONES UR STRIP-MCNC: NEGATIVE MG/DL
LEUKOCYTE ESTERASE UR QL STRIP: ABNORMAL
MUCOUS THREADS UR QL AUTO: ABNORMAL
NITRITE UR QL STRIP: NEGATIVE
NON-SQ EPI CELLS URNS QL MICRO: ABNORMAL /HPF
PH UR STRIP.AUTO: 6 [PH]
PROT UR STRIP-MCNC: ABNORMAL MG/DL
RBC #/AREA URNS AUTO: ABNORMAL /HPF
SP GR UR STRIP.AUTO: 1.02 (ref 1–1.03)
TRANS CELLS #/AREA URNS HPF: PRESENT /[HPF]
UROBILINOGEN UR STRIP-ACNC: 2 MG/DL
WBC #/AREA URNS AUTO: ABNORMAL /HPF

## 2023-12-13 PROCEDURE — 87086 URINE CULTURE/COLONY COUNT: CPT

## 2023-12-13 PROCEDURE — 87077 CULTURE AEROBIC IDENTIFY: CPT

## 2023-12-13 PROCEDURE — 87186 SC STD MICRODIL/AGAR DIL: CPT

## 2023-12-13 PROCEDURE — 81001 URINALYSIS AUTO W/SCOPE: CPT | Performed by: UROLOGY

## 2023-12-15 ENCOUNTER — OFFICE VISIT (OUTPATIENT)
Dept: UROLOGY | Facility: CLINIC | Age: 73
End: 2023-12-15
Payer: MEDICARE

## 2023-12-15 ENCOUNTER — TELEPHONE (OUTPATIENT)
Dept: UROLOGY | Facility: CLINIC | Age: 73
End: 2023-12-15

## 2023-12-15 VITALS
DIASTOLIC BLOOD PRESSURE: 84 MMHG | HEART RATE: 93 BPM | BODY MASS INDEX: 34.09 KG/M2 | OXYGEN SATURATION: 97 % | HEIGHT: 73 IN | WEIGHT: 257.2 LBS | SYSTOLIC BLOOD PRESSURE: 132 MMHG

## 2023-12-15 DIAGNOSIS — R35.0 BENIGN PROSTATIC HYPERPLASIA WITH URINARY FREQUENCY: ICD-10-CM

## 2023-12-15 DIAGNOSIS — N39.0 URINARY TRACT INFECTION WITHOUT HEMATURIA, SITE UNSPECIFIED: Primary | ICD-10-CM

## 2023-12-15 DIAGNOSIS — N40.1 BENIGN PROSTATIC HYPERPLASIA WITH URINARY FREQUENCY: ICD-10-CM

## 2023-12-15 LAB — BACTERIA UR CULT: ABNORMAL

## 2023-12-15 PROCEDURE — 99213 OFFICE O/P EST LOW 20 MIN: CPT | Performed by: PHYSICIAN ASSISTANT

## 2023-12-15 RX ORDER — TOLTERODINE 4 MG/1
4 CAPSULE, EXTENDED RELEASE ORAL DAILY
Qty: 90 CAPSULE | Refills: 3 | Status: SHIPPED | OUTPATIENT
Start: 2023-12-15

## 2023-12-15 RX ORDER — FINASTERIDE 5 MG/1
5 TABLET, FILM COATED ORAL DAILY
Qty: 90 TABLET | Refills: 3 | Status: SHIPPED | OUTPATIENT
Start: 2023-12-15

## 2023-12-15 RX ORDER — LEVOFLOXACIN 500 MG/1
500 TABLET, FILM COATED ORAL EVERY 24 HOURS
Qty: 7 TABLET | Refills: 0 | Status: SHIPPED | OUTPATIENT
Start: 2023-12-15 | End: 2023-12-22

## 2023-12-15 RX ORDER — TAMSULOSIN HYDROCHLORIDE 0.4 MG/1
0.4 CAPSULE ORAL
Qty: 90 CAPSULE | Refills: 3 | Status: SHIPPED | OUTPATIENT
Start: 2023-12-15

## 2023-12-15 RX ORDER — OLMESARTAN MEDOXOMIL 40 MG/1
1 TABLET ORAL DAILY
COMMUNITY
Start: 2023-10-23

## 2023-12-15 NOTE — TELEPHONE ENCOUNTER
Patient did not want to schedule an appointment at check out. He said he will call back. Please make sure this gets scheduled. Provider note:      Return for Uroflow, TRUS, cysto office with verges.

## 2023-12-15 NOTE — PROGRESS NOTES
UROLOGY PROGRESS NOTE   Patient Identifiers: Edvin Coulter (MRN [id number])  Date of Service: 12/15/2023    Subjective:   66-year-old man history of BPH. He has had recurrent urinary tract infections over the last several months. He has been on tamsulosin, finasteride and oxybutynin.     Reason for visit: BPH/overactive bladder follow-up    Objective:     VITALS:    Vitals:    12/15/23 1114   BP: 132/84   Pulse: 93   SpO2: 97%     AUA SYMPTOM SCORE      Flowsheet Row Most Recent Value   AUA SYMPTOM SCORE    How often have you had a sensation of not emptying your bladder completely after you finished urinating? 4 (P)    How often have you had to urinate again less than two hours after you finished urinating? 4 (P)    How often have you found you stopped and started again several times when you urinate? 4 (P)    How often have you found it difficult to postpone urination? 4 (P)    How often have you had a weak urinary stream? 4 (P)    How often have you had to push or strain to begin urination? 4 (P)    How many times did you most typically get up to urinate from the time you went to bed at night until the time you got up in the morning? 1 (P)    Quality of Life: If you were to spend the rest of your life with your urinary condition just the way it is now, how would you feel about that? 4 (P)    AUA SYMPTOM SCORE 25 (P)               LABS:  Lab Results   Component Value Date    HGB 15.0 02/21/2023    HCT 43.4 02/21/2023    WBC 6.58 02/21/2023     02/21/2023   ]    Lab Results   Component Value Date    K 4.2 02/21/2023     02/21/2023    CO2 26 02/21/2023    BUN 21 02/21/2023    CREATININE 1.15 02/21/2023    CALCIUM 9.7 02/21/2023   ]        INPATIENT MEDS:    Current Outpatient Medications:     aspirin (ECOTRIN LOW STRENGTH) 81 mg EC tablet, Take 81 mg by mouth daily, Disp: , Rfl:     bepotastine besilate (BEPREVE) 1.5 % op soln, Administer 1 drop to both eyes 2 (two) times a day as needed , Disp: , Rfl:     betamethasone, augmented, (DIPROLENE) 0.05 % ointment, Apply 1 application topically daily as needed For rosacea , Disp: , Rfl:     Cholecalciferol 25 MCG (1000 UT) TBDP, Take 3,000 Units by mouth daily, Disp: , Rfl:     Cholecalciferol 50 MCG (2000 UT) CAPS, Take 2,000 Units by mouth daily, Disp: , Rfl:     esomeprazole (NexIUM) 40 MG capsule, Take 40 mg by mouth daily, Disp: , Rfl:     finasteride (PROSCAR) 5 mg tablet, Take 1 tablet (5 mg total) by mouth daily, Disp: 90 tablet, Rfl: 3    fluticasone (FLONASE) 50 mcg/act nasal spray, 1 spray 2 (two) times a day Shake liquid and spray, Disp: , Rfl:     gabapentin (NEURONTIN) 300 mg capsule, , Disp: , Rfl:     irbesartan (AVAPRO) 300 mg tablet, Take 1 tablet by mouth Daily , Disp: , Rfl:     levofloxacin (LEVAQUIN) 500 mg tablet, Take 1 tablet (500 mg total) by mouth every 24 hours for 7 days, Disp: 7 tablet, Rfl: 0    meloxicam (MOBIC) 15 mg tablet, Take 15 mg by mouth daily, Disp: , Rfl:     metoprolol succinate (TOPROL-XL) 100 mg 24 hr tablet, Take 100 mg by mouth daily, Disp: , Rfl:     Multiple Vitamins-Minerals (PreserVision AREDS) CAPS, Take 1 capsule by mouth daily , Disp: , Rfl:     olmesartan (BENICAR) 40 mg tablet, Take 1 tablet by mouth daily, Disp: , Rfl:     rosuvastatin (CRESTOR) 10 MG tablet, Take 10 mg by mouth daily, Disp: , Rfl:     tamsulosin (FLOMAX) 0.4 mg, Take 1 capsule (0.4 mg total) by mouth daily with dinner, Disp: 90 capsule, Rfl: 3    tolterodine (DETROL LA) 4 mg 24 hr capsule, Take 1 capsule (4 mg total) by mouth daily, Disp: 90 capsule, Rfl: 3    traZODone (DESYREL) 100 mg tablet, Take 1 tablet (100 mg total) by mouth daily at bedtime, Disp: 90 tablet, Rfl: 3    triamterene-hydrochlorothiazide (DYAZIDE) 37.5-25 mg per capsule, triamterene 37.5 mg-hydrochlorothiazide 25 mg capsule  TAKE 1 CAPSULE BY MOUTH EVERY DAY, Disp: , Rfl:     metroNIDAZOLE (METROGEL) 1 % gel, Apply 1 application topically daily as needed, Disp: , Rfl: Xiidra 5 % op solution, Administer 1 drop to both eyes 2 (two) times a day , Disp: , Rfl:       Physical Exam:   /84 (BP Location: Left arm, Patient Position: Sitting, Cuff Size: Standard)   Pulse 93   Ht 6' 1" (1.854 m)   Wt 117 kg (257 lb 3.2 oz)   SpO2 97%   BMI 33.93 kg/m²   GEN: no acute distress    RESP: breathing comfortably with no accessory muscle use    ABD: soft, non-tender, non-distended   INCISION:    EXT: no significant peripheral edema       RADIOLOGY:   None    Assessment:   #1. BPH #2.   Recurrent urinary tract infection with hematuria      Plan:   -I put him on 7 days of Levaquin followed by 90 days of nitrofurantoin at bedtime  -Cystoscopy TRUS and uroflow in the office                                                                                 -  -

## 2023-12-18 DIAGNOSIS — N39.0 RECURRENT UTI: Primary | ICD-10-CM

## 2023-12-18 RX ORDER — NITROFURANTOIN MACROCRYSTALS 50 MG/1
50 CAPSULE ORAL
Qty: 90 CAPSULE | Refills: 0 | Status: SHIPPED | OUTPATIENT
Start: 2023-12-18

## 2024-03-20 ENCOUNTER — PATIENT MESSAGE (OUTPATIENT)
Dept: UROLOGY | Facility: CLINIC | Age: 74
End: 2024-03-20

## 2024-03-20 DIAGNOSIS — R39.9 UTI SYMPTOMS: Primary | ICD-10-CM

## 2024-03-21 ENCOUNTER — APPOINTMENT (OUTPATIENT)
Age: 74
End: 2024-03-21
Payer: COMMERCIAL

## 2024-03-21 DIAGNOSIS — N39.0 URINARY TRACT INFECTION WITHOUT HEMATURIA, SITE UNSPECIFIED: Primary | ICD-10-CM

## 2024-03-21 LAB
AMORPH URATE CRY URNS QL MICRO: ABNORMAL
BACTERIA UR QL AUTO: ABNORMAL /HPF
BILIRUB UR QL STRIP: NEGATIVE
CLARITY UR: CLEAR
COLOR UR: YELLOW
GLUCOSE UR STRIP-MCNC: NEGATIVE MG/DL
HGB UR QL STRIP.AUTO: NEGATIVE
KETONES UR STRIP-MCNC: NEGATIVE MG/DL
LEUKOCYTE ESTERASE UR QL STRIP: NEGATIVE
MUCOUS THREADS UR QL AUTO: ABNORMAL
NITRITE UR QL STRIP: NEGATIVE
NON-SQ EPI CELLS URNS QL MICRO: ABNORMAL /HPF
PH UR STRIP.AUTO: 6 [PH]
PROT UR STRIP-MCNC: ABNORMAL MG/DL
RBC #/AREA URNS AUTO: ABNORMAL /HPF
SP GR UR STRIP.AUTO: 1.02 (ref 1–1.03)
UROBILINOGEN UR STRIP-ACNC: 2 MG/DL
WBC #/AREA URNS AUTO: ABNORMAL /HPF

## 2024-03-21 PROCEDURE — 87086 URINE CULTURE/COLONY COUNT: CPT | Performed by: PHYSICIAN ASSISTANT

## 2024-03-21 PROCEDURE — 81001 URINALYSIS AUTO W/SCOPE: CPT | Performed by: PHYSICIAN ASSISTANT

## 2024-03-21 RX ORDER — LEVOFLOXACIN 500 MG/1
500 TABLET, FILM COATED ORAL EVERY 24 HOURS
Qty: 7 TABLET | Refills: 0 | Status: SHIPPED | OUTPATIENT
Start: 2024-03-21 | End: 2024-03-28

## 2024-03-21 NOTE — PATIENT COMMUNICATION
Pt called in stating he has been having some discomfort while urinating and noticed a little bit of blood in his urine. Denies any fevers or clots. States this started about 3 days ago. He was on a 90 day supply of nitrofurantoin for recurrent UTI's which he completed last week. Placed urine tests to r/o infection. Pt will go to lab this morning. He would like to know if he should be on an antibiotic in the meantime given his history. Please advise.

## 2024-03-22 LAB — BACTERIA UR CULT: NORMAL

## 2024-03-24 NOTE — PROGRESS NOTES
Problem List Items Addressed This Visit       Essential hypertension (Chronic)    Benign prostatic hyperplasia with urinary frequency (Chronic)    Encounter to discuss test results    Benign prostatic hyperplasia with weak urinary stream - Primary    Relevant Orders    Case request operating room: TRANSURETHRAL RESECTION OF PROSTATE (TURP) (Completed)             Discussion:      Bladder outlet vascular prostate on cystoscopy today.  The prostate measures 35.67 g.  He did have a uroflow and PVR today which shows a low flow of 8.4 mL/s, a postvoid residual urine volume of 19 mL, and a prolonged voiding time with a sawtooth voiding pattern consistent with obstruction.    I spoke with him about the pre-, dorina-, postoperative care for transurethral resection of prostate.  He is aware of the benefits of opening the prostatic urethra to help him urinate, the long-term benefit of getting him off of his prostate medications, and aware of the potential risks of bladder neck contracture and urethral stricture, changes in sexual ejaculatory function, and the risks of infection, bleeding, pain, damage to surrounding structures, need for additional procedures, risk of failure of the procedure, risk of anesthesia, risk of positioning complications including neurapraxia, chronic pain, and paralysis as well as risk of rhabdomyolysis and compartment syndrome.  Risk of deep venous thrombosis and venous thromboembolism as well as pneumonia and other potential unpredictable complications were also discussed with the patient.    He will follow-up for transurethral resection of prostate.  No evidence of malignancy.  His upper tract imaging was performed at [facility] and shows no renal lesions or stones or hydronephrosis.    I have spent a total time of 30 minutes on 03/28/24 in caring for this patient including Diagnostic results, Prognosis, Risks and benefits of tx options, Instructions for management, Risk  "factor reductions, Impressions, Counseling / Coordination of care, Documenting in the medical record, Reviewing / ordering tests, medicine, procedures  , and Obtaining or reviewing history  .     Portions of the above record have been created with voice recognition software.  Occasional wrong word or \"sound alike\" substitution may have occurred due to the inherent limitations of voice recognition software.  Read the chart carefully and recognize, using context, where substitution may have occurred.    Assessment and plan:       Please see problem oriented charting for the assessment plan of today's urological complaints      Lm Shen MD      Chief Complaint     As listed above      History of Present Illness     Humphrey Kiser is a 73 y.o. man with lower urinary tract symptoms, he has previously been on medical therapies.  He has been having recurrent urinary tract infections and some gross hematuria.  Workup is negative for malignancy.  He is interested in TURP.      The following portions of the patient's history were reviewed and updated as appropriate: allergies, current medications, past family history, past medical history, past social history, past surgical history and problem list.    Detailed Urologic History     - please refer to HPI    Review of Systems     Review of Systems   Constitutional: Negative.    HENT: Negative.     Eyes: Negative.    Respiratory: Negative.     Cardiovascular: Negative.    Gastrointestinal: Negative.    Endocrine: Negative.    Genitourinary:         As per HPI   Musculoskeletal: Negative.    Skin: Negative.    Allergic/Immunologic: Negative.    Neurological: Negative.    Hematological: Negative.    Psychiatric/Behavioral: Negative.               Allergies     No Known Allergies    Physical Exam     Physical Exam  Vitals and nursing note reviewed.   Constitutional:       General: He is not in acute distress.     Appearance: Normal appearance. He is well-developed. He is not " "ill-appearing, toxic-appearing or diaphoretic.   HENT:      Head: Normocephalic and atraumatic.   Eyes:      General: No scleral icterus.  Pulmonary:      Effort: Pulmonary effort is normal. No respiratory distress.   Abdominal:      General: There is no distension.      Palpations: Abdomen is soft.      Tenderness: There is no abdominal tenderness. There is no right CVA tenderness or left CVA tenderness.   Genitourinary:     Penis: Normal.    Musculoskeletal:         General: No deformity.      Cervical back: Neck supple.   Skin:     Coloration: Skin is not jaundiced or pale.   Neurological:      Mental Status: He is alert and oriented to person, place, and time. Mental status is at baseline.      Cranial Nerves: No cranial nerve deficit.      Motor: No weakness.      Coordination: Coordination normal.   Psychiatric:         Mood and Affect: Mood normal.         Behavior: Behavior normal.         Thought Content: Thought content normal.         Judgment: Judgment normal.             Vital Signs  Vitals:    03/28/24 1448   BP: 124/82   BP Location: Left arm   Patient Position: Sitting   Cuff Size: Adult   Pulse: 75   SpO2: 98%   Weight: 114 kg (251 lb 12.8 oz)   Height: 6' 1\" (1.854 m)         Current Medications       Current Outpatient Medications:     aspirin (ECOTRIN LOW STRENGTH) 81 mg EC tablet, Take 81 mg by mouth daily, Disp: , Rfl:     Cholecalciferol 50 MCG (2000 UT) CAPS, Take 2,000 Units by mouth daily, Disp: , Rfl:     esomeprazole (NexIUM) 40 MG capsule, Take 40 mg by mouth daily, Disp: , Rfl:     finasteride (PROSCAR) 5 mg tablet, Take 1 tablet (5 mg total) by mouth daily, Disp: 90 tablet, Rfl: 3    gabapentin (NEURONTIN) 300 mg capsule, , Disp: , Rfl:     metoprolol succinate (TOPROL-XL) 100 mg 24 hr tablet, Take 100 mg by mouth daily, Disp: , Rfl:     Multiple Vitamins-Minerals (PreserVision AREDS) CAPS, Take 1 capsule by mouth daily , Disp: , Rfl:     olmesartan (BENICAR) 40 mg tablet, Take 1 " tablet by mouth daily, Disp: , Rfl:     rosuvastatin (CRESTOR) 10 MG tablet, Take 10 mg by mouth daily, Disp: , Rfl:     tamsulosin (FLOMAX) 0.4 mg, Take 1 capsule (0.4 mg total) by mouth daily with dinner, Disp: 90 capsule, Rfl: 3    tolterodine (DETROL LA) 4 mg 24 hr capsule, Take 1 capsule (4 mg total) by mouth daily, Disp: 90 capsule, Rfl: 3    traZODone (DESYREL) 100 mg tablet, Take 1 tablet (100 mg total) by mouth daily at bedtime, Disp: 90 tablet, Rfl: 3    betamethasone, augmented, (DIPROLENE) 0.05 % ointment, Apply 1 application topically daily as needed For rosacea  (Patient not taking: Reported on 3/28/2024), Disp: , Rfl:     fluticasone (FLONASE) 50 mcg/act nasal spray, 1 spray 2 (two) times a day Shake liquid and spray (Patient not taking: Reported on 3/28/2024), Disp: , Rfl:     levofloxacin (LEVAQUIN) 500 mg tablet, Take 1 tablet (500 mg total) by mouth every 24 hours for 7 days (Patient not taking: Reported on 3/28/2024), Disp: 7 tablet, Rfl: 0    metroNIDAZOLE (METROGEL) 1 % gel, Apply 1 application topically daily as needed (Patient not taking: Reported on 3/28/2024), Disp: , Rfl:       Active Problems     Patient Active Problem List   Diagnosis    Irritable bowel syndrome with diarrhea    Mixed hyperlipidemia    Essential hypertension    Benign prostatic hyperplasia with urinary frequency    Duodenitis determined by biopsy    Seborrheic dermatitis    Small intestinal bacterial overgrowth (SIBO)    Choledochocele    Class 2 obesity due to excess calories without serious comorbidity with body mass index (BMI) of 35.0 to 35.9 in adult    Need for vaccination    Adenomatous polyp of transverse colon    Arthritis    Costochondral pain    Primary insomnia    Rosacea    Encounter to discuss test results    Medication management    Benign prostatic hyperplasia with weak urinary stream         Past Medical History     Past Medical History:   Diagnosis Date    Adenomatous polyp of transverse colon 12/9/2021     10/26/20 Tubular adenoma with focal high grade dysplasia. Also transverse tubular adenoma, Splenic flexure tubular adenoma    Arthritis     Hyperlipidemia     Hypertension          Surgical History     Past Surgical History:   Procedure Laterality Date    CHOLECYSTECTOMY  2001    EYE SURGERY           Family History     Family History   Problem Relation Age of Onset    Cancer Mother     Cancer Father          Social History     Social History     Social History     Tobacco Use   Smoking Status Never   Smokeless Tobacco Never         Pertinent Lab Values     Lab Results   Component Value Date    CREATININE 1.15 02/21/2023       Lab Results   Component Value Date    PSA 0.4 02/21/2023               Pertinent Imaging       The patient's images were reviewed by me personally and also in real time with them in the examination room using our PACS imaging system.  The imaging findings are significant for a 35.67 gram gland.

## 2024-03-28 ENCOUNTER — PROCEDURE VISIT (OUTPATIENT)
Dept: UROLOGY | Facility: CLINIC | Age: 74
End: 2024-03-28
Payer: MEDICARE

## 2024-03-28 VITALS
OXYGEN SATURATION: 98 % | WEIGHT: 251.8 LBS | DIASTOLIC BLOOD PRESSURE: 82 MMHG | BODY MASS INDEX: 33.37 KG/M2 | SYSTOLIC BLOOD PRESSURE: 124 MMHG | HEART RATE: 75 BPM | HEIGHT: 73 IN

## 2024-03-28 DIAGNOSIS — N40.1 BENIGN PROSTATIC HYPERPLASIA WITH WEAK URINARY STREAM: ICD-10-CM

## 2024-03-28 DIAGNOSIS — N39.0 URINARY TRACT INFECTION WITHOUT HEMATURIA, SITE UNSPECIFIED: ICD-10-CM

## 2024-03-28 DIAGNOSIS — R35.0 BENIGN PROSTATIC HYPERPLASIA WITH URINARY FREQUENCY: Chronic | ICD-10-CM

## 2024-03-28 DIAGNOSIS — N40.1 BENIGN PROSTATIC HYPERPLASIA WITH URINARY FREQUENCY: Chronic | ICD-10-CM

## 2024-03-28 DIAGNOSIS — I10 ESSENTIAL HYPERTENSION: Chronic | ICD-10-CM

## 2024-03-28 DIAGNOSIS — Z71.2 ENCOUNTER TO DISCUSS TEST RESULTS: Primary | ICD-10-CM

## 2024-03-28 DIAGNOSIS — R39.12 BENIGN PROSTATIC HYPERPLASIA WITH WEAK URINARY STREAM: ICD-10-CM

## 2024-03-28 PROCEDURE — 52000 CYSTOURETHROSCOPY: CPT | Performed by: UROLOGY

## 2024-03-28 PROCEDURE — 76872 US TRANSRECTAL: CPT | Performed by: UROLOGY

## 2024-03-28 PROCEDURE — 99214 OFFICE O/P EST MOD 30 MIN: CPT | Performed by: UROLOGY

## 2024-03-28 RX ORDER — FINASTERIDE 5 MG/1
5 TABLET, FILM COATED ORAL DAILY
Qty: 90 TABLET | Refills: 3 | Status: SHIPPED | OUTPATIENT
Start: 2024-03-28

## 2024-03-28 NOTE — PROGRESS NOTES
Office TRUS     Indication    Recurrent UTI, LUTS    Informed consent   The risks, benefits and alternatives to this procedures were discussed with the patient and he has participated in the informed consent process and wishes to proceed    Transrectal ultrasonography  The patient was placed in the left lateral decubitus position. After an attentive digital rectal examination, a 7.5 mHz sidefire ultrasound probe was gently inserted into the rectum and biplanar imaging of the prostate was done with the findings noted below. Images were taken of any abnormal findings and also to document prostate size.    Bladder  The bladder base appeared normal.    Prostate      Ultrasound size measurements:  -Volume:  35.67 cm3    Ultrasound findings:  -Cysts: None  -Masses: None  -Median lobe: absent                  Complications  There were no procedural complications.    Disposition  The patient was dismissed to home     Post-procedure instructions:     Today he underwent an uncomplicated transrectal ultrasound showing a gland amenable to TURP, no abscesses, no stones        Biopsy prostate     Date/Time  3/28/2024 3:00 PM     Performed by  Lm Shen MD   Authorized by  Lm Shen MD     Universal Protocol   Consent: Verbal consent obtained. Written consent obtained.  Risks and benefits: risks, benefits and alternatives were discussed  Consent given by: patient  Patient understanding: patient states understanding of the procedure being performed  Patient consent: the patient's understanding of the procedure matches consent given  Procedure consent: procedure consent matches procedure scheduled  Relevant documents: relevant documents present and verified  Test results: test results available and properly labeled  Site marked: the operative site was not marked  Radiology Images displayed and confirmed. If images not available, report reviewed: imaging studies available  Required items: required blood products,  implants, devices, and special equipment available  Patient identity confirmed: verbally with patient and provided demographic data      Local anesthesia used: no     Anesthesia   Local anesthesia used: no     Sedation   Patient sedated: no        Specimen: no    Culture: no   Procedure Details   Procedure Notes: Office TRUS     Indication    Recurrent UTI, LUTS    Informed consent   The risks, benefits and alternatives to this procedures were discussed with the patient and he has participated in the informed consent process and wishes to proceed    Transrectal ultrasonography  The patient was placed in the left lateral decubitus position. After an attentive digital rectal examination, a 7.5 mHz sidefire ultrasound probe was gently inserted into the rectum and biplanar imaging of the prostate was done with the findings noted below. Images were taken of any abnormal findings and also to document prostate size.    Bladder  The bladder base appeared normal.    Prostate      Ultrasound size measurements:  -Volume:  35.67 cm3    Ultrasound findings:  -Cysts: None  -Masses: None  -Median lobe: absent                  Complications  There were no procedural complications.    Disposition  The patient was dismissed to home     Post-procedure instructions:     Today he underwent an uncomplicated transrectal ultrasound showing a gland amenable to TURP, no abscesses, no stones  Patient Transportation: confirmed  Patient tolerance: patient tolerated the procedure well with no immediate complications

## 2024-03-28 NOTE — PROGRESS NOTES
Office Cystoscopy Procedure Note    Indication:     medically refractory lower urinary tract symptoms     Informed consent   The risks, benefits, complications, treatment options, and expected outcomes were discussed with the patient. The patient concurred with the proposed plan and provided informed consent.    Anesthesia  Lidocaine jelly 2%    Antibiotic prophylaxis   None    Procedure  The patient was placed in the supineposition, was prepped and draped in the usual manner using sterile technique, and 2% lidocaine jelly instilled into the urethra.  A 17 F flexible cystoscope was then inserted into the urethra and the urethra and bladder carefully examined.  The following findings were noted:    Findings:  Urethra:  Normal  Prostate:  prominent lateral lobe hypertrophy, no median lobe, no lesions but a vascular prostate  Bladder:  No lesions, no tumors, no stones, high grade bladder outlet obstruction  Ureteral orifices:  orthotopic  Other findings:  None, retroflexed view confirms    Specimens: None                 Complications:    None; patient tolerated the procedure well           Disposition: To home            Condition: Stable    Plan: Proceed to TURP, negative for malignancy       Cystoscopy     Date/Time  3/28/2024 3:00 PM     Performed by  Lm Shen MD   Authorized by  Lm Shen MD     Universal Protocol:  Consent: Verbal consent obtained. Written consent obtained.  Risks and benefits: risks, benefits and alternatives were discussed  Consent given by: patient  Patient understanding: patient states understanding of the procedure being performed  Patient consent: the patient's understanding of the procedure matches consent given  Procedure consent: procedure consent matches procedure scheduled  Relevant documents: relevant documents present and verified  Test results: test results available and properly labeled  Site marked: the operative site was not marked  Radiology Images displayed and  confirmed. If images not available, report reviewed: imaging studies available  Required items: required blood products, implants, devices, and special equipment available  Patient identity confirmed: verbally with patient and provided demographic data      Procedure Details:  Procedure type: cystoscopy    Patient tolerance: Patient tolerated the procedure well with no immediate complications    Additional Procedure Details: Office Cystoscopy Procedure Note    Indication:     medically refractory lower urinary tract symptoms     Informed consent   The risks, benefits, complications, treatment options, and expected outcomes were discussed with the patient. The patient concurred with the proposed plan and provided informed consent.    Anesthesia  Lidocaine jelly 2%    Antibiotic prophylaxis   None    Procedure  The patient was placed in the supineposition, was prepped and draped in the usual manner using sterile technique, and 2% lidocaine jelly instilled into the urethra.  A 17 F flexible cystoscope was then inserted into the urethra and the urethra and bladder carefully examined.  The following findings were noted:    Findings:  Urethra:  Normal  Prostate:  prominent lateral lobe hypertrophy, no median lobe, no lesions but a vascular prostate  Bladder:  No lesions, no tumors, no stones, high grade bladder outlet obstruction  Ureteral orifices:  orthotopic  Other findings:  None, retroflexed view confirms    Specimens: None                 Complications:    None; patient tolerated the procedure well           Disposition: To home            Condition: Stable    Plan: Proceed to TURP, negative for malignancy

## 2024-04-08 ENCOUNTER — TELEPHONE (OUTPATIENT)
Dept: UROLOGY | Facility: CLINIC | Age: 74
End: 2024-04-08

## 2024-04-08 NOTE — TELEPHONE ENCOUNTER
Spoke w/ pt/pt is undecided at this time to schedule TURP surgery. Telephone encounter w/ pt and Cody QUINTANA discuss possible option of Urolift. Message sent to Dr Shen for his recommendation.

## 2024-04-10 ENCOUNTER — PREP FOR PROCEDURE (OUTPATIENT)
Dept: UROLOGY | Facility: CLINIC | Age: 74
End: 2024-04-10

## 2024-04-10 ENCOUNTER — TELEPHONE (OUTPATIENT)
Dept: UROLOGY | Facility: CLINIC | Age: 74
End: 2024-04-10

## 2024-04-10 DIAGNOSIS — R39.11 BENIGN PROSTATIC HYPERPLASIA WITH URINARY HESITANCY: Primary | ICD-10-CM

## 2024-04-10 DIAGNOSIS — N40.1 BENIGN PROSTATIC HYPERPLASIA WITH URINARY HESITANCY: Primary | ICD-10-CM

## 2024-04-10 NOTE — TELEPHONE ENCOUNTER
Spoke w/ pt/scheduled pt for surgery w/ Dr Shen @ [facility] for 5/16/24/pre op urine cult, labs, EKG ordered for 4/25/24/Deepa Reyes for UROLIFT emailed/aware/surgical packet emailed to pt

## 2024-04-10 NOTE — TELEPHONE ENCOUNTER
----- Message from Maria R Angulo sent at 4/10/2024  2:49 PM EDT -----  Regarding: case request order  Jeffery Ross-can you put a case request order in for this pt-for cysto w/ UROLIFT?? Dr Shen said it was OK. Thank you

## 2024-04-11 ENCOUNTER — PREP FOR PROCEDURE (OUTPATIENT)
Dept: UROLOGY | Facility: CLINIC | Age: 74
End: 2024-04-11

## 2024-04-11 DIAGNOSIS — R39.89 SUSPECTED UTI: ICD-10-CM

## 2024-04-11 DIAGNOSIS — Z01.812 PRE-OPERATIVE LABORATORY EXAMINATION: ICD-10-CM

## 2024-04-11 DIAGNOSIS — N40.1 BENIGN PROSTATIC HYPERPLASIA WITH WEAK URINARY STREAM: Primary | ICD-10-CM

## 2024-04-11 DIAGNOSIS — Z01.810 PRE-OPERATIVE CARDIOVASCULAR EXAMINATION: ICD-10-CM

## 2024-04-11 DIAGNOSIS — Z79.01 LONG TERM (CURRENT) USE OF ANTICOAGULANTS: ICD-10-CM

## 2024-04-11 DIAGNOSIS — R39.12 BENIGN PROSTATIC HYPERPLASIA WITH WEAK URINARY STREAM: Primary | ICD-10-CM

## 2024-05-01 PROBLEM — N39.0 URINARY TRACT INFECTION WITHOUT HEMATURIA: Status: RESOLVED | Noted: 2024-03-28 | Resolved: 2024-05-01

## 2024-05-13 DIAGNOSIS — Z00.6 ENCOUNTER FOR EXAMINATION FOR NORMAL COMPARISON OR CONTROL IN CLINICAL RESEARCH PROGRAM: ICD-10-CM

## 2024-09-09 ENCOUNTER — OFFICE VISIT (OUTPATIENT)
Dept: OBGYN CLINIC | Facility: HOSPITAL | Age: 74
End: 2024-09-09
Payer: MEDICARE

## 2024-09-09 ENCOUNTER — HOSPITAL ENCOUNTER (OUTPATIENT)
Dept: RADIOLOGY | Facility: HOSPITAL | Age: 74
Discharge: HOME | End: 2024-09-09
Attending: ORTHOPAEDIC SURGERY
Payer: COMMERCIAL

## 2024-09-09 VITALS
SYSTOLIC BLOOD PRESSURE: 126 MMHG | BODY MASS INDEX: 33.31 KG/M2 | HEART RATE: 68 BPM | HEIGHT: 73 IN | WEIGHT: 251.32 LBS | DIASTOLIC BLOOD PRESSURE: 75 MMHG

## 2024-09-09 DIAGNOSIS — M53.3 SI (SACROILIAC) JOINT DYSFUNCTION: ICD-10-CM

## 2024-09-09 DIAGNOSIS — R52 PAIN: Primary | ICD-10-CM

## 2024-09-09 DIAGNOSIS — M54.16 LUMBAR RADICULOPATHY: ICD-10-CM

## 2024-09-09 DIAGNOSIS — R52 PAIN: ICD-10-CM

## 2024-09-09 PROCEDURE — 72110 X-RAY EXAM L-2 SPINE 4/>VWS: CPT

## 2024-09-09 PROCEDURE — 99204 OFFICE O/P NEW MOD 45 MIN: CPT | Performed by: ORTHOPAEDIC SURGERY

## 2024-09-09 RX ORDER — PLECANATIDE 3 MG/1
TABLET ORAL DAILY
COMMUNITY

## 2024-09-09 NOTE — PROGRESS NOTES
Assessment & Plan/Medical Decision Makin y.o. male with Back Pain, Left Radicular Leg Pain, and Left Gluteal Pain and imaging findings most notable for lumbar spondylosis  and L4-5 degenerative spondylolisthesis         The clinical, physical and imaging findings were reviewed with the patient.  Humphrey  has a constellation of findings consistent with Lumbar Radiculopathy and Sacroiliac Joint Dysfunction in the setting of lumbar degenerative disease.      Fortunately patient remains neurologically intact and functional. Physical exam showing +TTP left SI joint, +TTP left IT band region.  We discussed the treatment options including physical therapy, at home exercises, activity modifications, chiropractic medicine, oral medications, interventional spine procedures.  At this time recommend continued conservative treatments.    Given ongoing back pain with left lower extremity pain and L4-5 spondylolisthesis, will plan to obtain MRI lumbar spine to further evaluate patient's symptoms. Will review MRI in detail with patient at follow-up visit and discuss further treatment options at that time.    Referral placed for physical therapy to work on core strengthening, lumbar ROM, strengthening, and stretching exercises.    Patient instructed to return to office/ER sooner if symptoms are not improving, getting worse, or new worrisome/neurologic symptoms arise.  Patient will follow up in about 6 weeks after formal physical therapy if pain persists.       Subjective:      Chief Complaint: Back Pain    HPI:  Humphrey Kiser is a 74 y.o. male presenting for initial visit with chief complaint of back pain. Reports ongoing left gluteal region pain with radiation into lateral left thigh. Pain worse when going from a seated to standing position and with prolonged sitting and standing. Worse when walking on uneven ground. Improves when moving, noting it feels like it loosens up. Also with increased back pain with back extension.  Denies lower extremity weakness. Does report history of peripheral neuropathy, unchanged bilateral foot numbness/tingling. Denies any pati trauma. Denies fever or chills, no night sweats. Denies any bladder or bowel changes.      Intermittent neck pain with occasional hand numbness/tingling. Denies upper extremity weakness. Denies dropping items frequently or changes in fine motor skills/dexterity. Denies balance issues.    Denies heart or lung disease. Denies diabetes or kidney disease.    Conservative therapy includes the following:   Medications: gabapentin with some relief, advil/aleve    Injections: denies     Physical Therapy: denies  Chiropractic Medicine: has not attempted  Accupunture/Massage Therapy: has not attempted   These therapeutic modalities were ineffective at providing sustained pain relief/functional improvement.     Nicotine dependent: denies  Occupation: retired,   Living situation: Lives with family   ADLs: patient is able to perform     Objective:     Family History   Problem Relation Age of Onset    Cancer Mother     Cancer Father        Past Medical History:   Diagnosis Date    Adenomatous polyp of transverse colon 12/9/2021    10/26/20 Tubular adenoma with focal high grade dysplasia. Also transverse tubular adenoma, Splenic flexure tubular adenoma    Arthritis     Hyperlipidemia     Hypertension        Current Outpatient Medications   Medication Sig Dispense Refill    aspirin (ECOTRIN LOW STRENGTH) 81 mg EC tablet Take 81 mg by mouth daily      Cholecalciferol 50 MCG (2000 UT) CAPS Take 2,000 Units by mouth daily      esomeprazole (NexIUM) 40 MG capsule Take 40 mg by mouth daily      finasteride (PROSCAR) 5 mg tablet Take 1 tablet (5 mg total) by mouth daily 90 tablet 3    gabapentin (NEURONTIN) 300 mg capsule       metoprolol succinate (TOPROL-XL) 100 mg 24 hr tablet Take 100 mg by mouth daily      Multiple Vitamins-Minerals (PreserVision AREDS) CAPS Take 1 capsule by mouth  daily       olmesartan (BENICAR) 40 mg tablet Take 1 tablet by mouth daily      rosuvastatin (CRESTOR) 10 MG tablet Take 10 mg by mouth daily      tamsulosin (FLOMAX) 0.4 mg Take 1 capsule (0.4 mg total) by mouth daily with dinner 90 capsule 3    tolterodine (DETROL LA) 4 mg 24 hr capsule Take 1 capsule (4 mg total) by mouth daily 90 capsule 3    traZODone (DESYREL) 100 mg tablet Take 1 tablet (100 mg total) by mouth daily at bedtime 90 tablet 3    betamethasone, augmented, (DIPROLENE) 0.05 % ointment Apply 1 application topically daily as needed For rosacea  (Patient not taking: Reported on 3/28/2024)      fluticasone (FLONASE) 50 mcg/act nasal spray 1 spray 2 (two) times a day Shake liquid and spray (Patient not taking: Reported on 3/28/2024)      metroNIDAZOLE (METROGEL) 1 % gel Apply 1 application topically daily as needed (Patient not taking: Reported on 3/28/2024)      Trulance 3 MG TABS Take by mouth daily       No current facility-administered medications for this visit.       Past Surgical History:   Procedure Laterality Date    CHOLECYSTECTOMY  2001    EYE SURGERY         Social History     Socioeconomic History    Marital status: /Civil Union     Spouse name: Not on file    Number of children: 3    Years of education: Not on file    Highest education level: Not on file   Occupational History    Not on file   Tobacco Use    Smoking status: Never    Smokeless tobacco: Never   Vaping Use    Vaping status: Never Used   Substance and Sexual Activity    Alcohol use: Yes     Comment: occ    Drug use: Never    Sexual activity: Not on file   Other Topics Concern    Not on file   Social History Narrative    Not on file     Social Determinants of Health     Financial Resource Strain: Not on file   Food Insecurity: Not on file   Transportation Needs: Not on file   Physical Activity: Not on file   Stress: Not on file   Social Connections: Not on file   Intimate Partner Violence: Not on file   Housing Stability:  "Not on file       No Known Allergies    Review of Systems  General- denies fever/chills  HEENT- denies hearing loss or sore throat  Eyes- denies eye pain or visual disturbances, denies red eyes  Respiratory- denies cough or SOB  Cardio- denies chest pain or palpitations  GI- denies abdominal pain  Endocrine- denies urinary frequency  Urinary- denies pain with urination  Musculoskeletal- Negative except noted above  Skin- denies rashes or wounds  Neurological- denies dizziness or headache  Psychiatric- denies anxiety or difficulty concentrating    Physical Exam  /75   Pulse 68   Ht 6' 1\" (1.854 m)   Wt 114 kg (251 lb 5.2 oz)   BMI 33.16 kg/m²     General/Constitutional: No apparent distress: well-nourished and well developed.  Lymphatic: No appreciable lymphadenopathy  Respiratory: Non-labored breathing  Vascular: No edema, swelling or tenderness, except as noted in detailed exam.  Integumentary: No impressive skin lesions present, except as noted in detailed exam.  Psych: Normal mood and affect, oriented to person, place and time.  MSK: normal other than stated in HPI and exam  Gait & balance: no evidence of myelopathic gait, ambulates Independently     Lumbar spine range of motion:  -Forward flexion to 90  -Extension to neutral  -Lateral bend 25 right, 25 left  -Rotation 25 right, 25 left  There tenderness with palpation along lumbar paraspinal musculature, no midline tenderness     Neurologic:  Lower Extremity Motor Function    Right  Left    Iliopsoas  5/5  5/5    Quadriceps 5/5 5/5   Tibialis anterior  5/5  5/5    EHL  5/5  5/5    Gastroc. muscle  5/5  5/5    Heel rise  5/5  5/5    Toe rise  5/5  5/5      Sensory: light touch is intact to bilateral upper and lower extremities     Reflexes:    Right Left   Patellar 1+ 1+   Achilles 1+ 1+   Babinski neg neg     Other tests:  Straight Leg Raise: negative  Jake SI: positive  DOC SI: negative  Greater troch: no tenderness   Internal/external hip ROM: " "intact, no pain   Flexion/extension knee ROM: intact, no pain   Vascular: WWP extremities, 2+DP bilateral      Diagnostic Tests   IMAGING: I have personally reviewed the images and these are my findings:  Lumbar Spine X-rays from 9/9/2024: multi level lumbar spondylosis with loss of disc height, osteophyte formation and facet hypertrophy, L4-5 degenerative spondylolisthesis, no appreciated lytic/blastic lesions, no obvious instability    Electronic Medical Records were reviewed including office notes, imaging studies    Procedures, if performed today     None performed       Portions of the record may have been created with voice recognition software.  Occasional wrong word or \"sound a like\" substitutions may have occurred due to the inherent limitations of voice recognition software.  Read the chart carefully and recognize, using context, where substitutions have occurred.  "

## 2024-09-23 ENCOUNTER — EVALUATION (OUTPATIENT)
Dept: PHYSICAL THERAPY | Facility: CLINIC | Age: 74
End: 2024-09-23
Payer: MEDICARE

## 2024-09-23 DIAGNOSIS — M54.6 CHRONIC RIGHT-SIDED THORACIC BACK PAIN: Primary | ICD-10-CM

## 2024-09-23 DIAGNOSIS — M54.50 LUMBAR PAIN WITH RADIATION DOWN LEFT LEG: ICD-10-CM

## 2024-09-23 DIAGNOSIS — M79.605 LUMBAR PAIN WITH RADIATION DOWN LEFT LEG: ICD-10-CM

## 2024-09-23 DIAGNOSIS — G89.29 CHRONIC RIGHT-SIDED THORACIC BACK PAIN: Primary | ICD-10-CM

## 2024-09-23 PROCEDURE — 97110 THERAPEUTIC EXERCISES: CPT

## 2024-09-23 PROCEDURE — 97162 PT EVAL MOD COMPLEX 30 MIN: CPT

## 2024-09-23 NOTE — PROGRESS NOTES
PT Evaluation     Today's date: 2024  Patient name: Humphrey Kiser  : 1950  MRN: [id number]  Referring provider: Amadou Leggett MD  Dx:   Encounter Diagnosis     ICD-10-CM    1. Chronic right-sided thoracic back pain  M54.6     G89.29       2. Lumbar pain with radiation down left leg  M54.50     M79.605         Start Time: 1415  Stop Time: 1500  Total time in clinic (min): 45 minutes    Assessment  Impairments: abnormal or restricted ROM, activity intolerance, impaired physical strength, lacks appropriate home exercise program, pain with function, poor posture , poor body mechanics, participation limitations, activity limitations and endurance  Symptom irritability: moderate    Assessment details: Patient is a pleasant 74 y.o. male who presents to physical therapy with main reports of chronic L sided lumbar pain w/ sciatica and R sided thoracic pain.    No further referral appears necessary at this time based upon examination results.    Primary movement impairment diagnosis of thoracic and lumbar hypomobility w/ gluteal weakness resulting in pathoanatomical symptoms of decreased ROM, decreased strength, and increase symptom irritability. This limits Jose J's ability to complete normal ADLs, walk long distances, and provide primary care giving for wife.     Prognosis is good given HEP compliance and PT 2 times per week over the next 12 weeks.  Positive prognostic indicators include positive attitude toward recovery.    Please contact me if you have any questions.  Thank you for the opportunity to share in Humphrey jonas.    Understanding of Dx/Px/POC: good     Prognosis: good    Goals  Short term:  Pt will be independent w/ individualized HEP  Pt will have a decrease in symptom irritability by 2 points     Long term:  Pt will be able to lift 20 pounds from floor to waste w/ minimal to no symptom irritability   Pt will be able to walk one mile w/o symptom irritability   Pt will be able to complete  Adls such as yard work and house remodeling w/ minimal to no symptoms   Pt will improve FOTO by [facility] [place]      Plan  Patient would benefit from: skilled physical therapy  Referral necessary: No    Planned therapy interventions: IASTM, joint mobilization, abdominal trunk stabilization, activity modification, manual therapy, massage, nerve gliding, neuromuscular re-education, patient/caregiver education, postural training, strengthening, stretching, therapeutic activities, therapeutic exercise, home exercise program, functional ROM exercises and balance    Frequency: 2x week  Plan of Care beginning date: 2024  Plan of Care expiration date: 2024  Treatment plan discussed with: patient        Subjective Evaluation    History of Present Illness  Mechanism of injury: Pt reports to outpatient PT for chronic lumbar and thoracic pain for many years. The lumbar pain is primarily located over the L side w/ sciatica symptoms going down the Lle to the bottom of the glute. Pt reports sometimes it shoots down the entire LLE. Mid back pain is located over T8-T11 on the R side. Pt has flank pain from chronic pancreatitis. Pt has Bilateral neuropathy in LE. Pt works on a property daily, clearing dead trees and chopping firewood. He works on his car frequently. Pt reports that he has trouble w/ stairs, weakness and pain in LLE. No red flags are present.             Recurrent probem    Quality of life: fair    Patient Goals  Patient goals for therapy: decreased pain, increased motion, increased strength, independence with ADLs/IADLs and return to sport/leisure activities  Patient goal: Improve quality of life  Pain  Current pain ratin  At best pain ratin  At worst pain ratin  Location: R low thoracic T9 and L lumbar  Quality: dull ache, burning, radiating, throbbing and tight  Aggravating factors: stair climbing, walking, standing, sitting and lifting  Progression: worsening    Social Support  Steps to enter house:  no  Stairs in house: no   Lives in: one-story house  Lives with: spouse    Employment status: not working  Treatments  Previous treatment: chiropractic and medication        Objective     Postural Observations  Seated posture: poor  Standing posture: poor  Correction of posture: makes symptoms better      Palpation     Right   Tenderness of the quadratus lumborum.     Tenderness     Lumbar Spine  Tenderness in the spinous process.     Additional Tenderness Details  Tenderness in R flank region    Neurological Testing     Sensation   Cervical/Thoracic   Left   Intact: light touch    Right   Intact: light touch    Lumbar   Left   Intact: light touch    Right   Intact: light touch    Reflexes   Left   Patellar (L4): normal (2+)  Achilles (S1): normal (2+)  Babinski sign: negative  Clonus sign: negative    Right   Patellar (L4): normal (2+)  Achilles (S1): normal (2+)  Babinski sign: negative  Clonus sign: negative    Active Range of Motion   Cervical/Thoracic Spine       Cervical  Subcranial protraction:  WFL   Flexion:  WFL  Extension:  WFL  Left lateral flexion:  WFL  Right lateral flexion:  WFL  Left rotation:  WFL  Right rotation:  WFL    Thoracic    Flexion:  WFL and with pain  Extension:  Restriction level: minimal  Left lateral flexion:  Restriction level: minimal  Right lateral flexion:  with pain Restriction level: minimal  Left rotation:  Restriction level: minimal  Right rotation:  with pain Restriction level: minimal    Lumbar   Flexion:  WFL  Extension:  with pain Restriction level: minimal  Left lateral flexion:  WFL  Right lateral flexion:  WFL  Left rotation:  WFL  Right rotation:  WFL and with pain    Joint Play     Hypomobile: T3, T4, T5, T6, T7, T8, T9, T10, T11, T12, L1, L2, L3, L4 and L5     Pain: T8, T9, L2, L3 and L4   Mechanical Assessment    Cervical      Thoracic      Lumbar    Lying extension: repeated movements  Pain location: no change    Strength/Myotome Testing     Left Shoulder      Planes of Motion   Flexion: 4+   Extension: 4   Abduction: 4+   Adduction: 4+     Right Shoulder     Planes of Motion   Flexion: 4+   Extension: 4   Abduction: 4+   Adduction: 4+     Left Elbow   Extension: 5    Right Elbow   Extension: 5    Left Wrist/Hand   Wrist extension: 5  Wrist flexion: 5  Radial deviation: 5  Ulnar deviation: 5  Thumb extension: 5    Right Wrist/Hand   Wrist extension: 5  Wrist flexion: 5  Radial deviation: 5  Ulnar deviation: 5  Thumb extension: 5    Left Hip   Planes of Motion   Flexion: 4  Extension: 4  Abduction: 4  Adduction: 4  External rotation: 4  Internal rotation: 4    Right Hip   Planes of Motion   Flexion: 4  Extension: 4  Abduction: 4+  Adduction: 4  External rotation: 4  Internal rotation: 4    Left Knee   Flexion: 4  Extension: 4+    Right Knee   Flexion: 4  Extension: 4+    Left Ankle/Foot   Dorsiflexion: 5  Plantar flexion: 5  Inversion: 5  Eversion: 5  Great toe flexion: 5  Great toe extension: 5    Right Ankle/Foot   Dorsiflexion: 5  Plantar flexion: 5  Inversion: 5  Eversion: 5  Great toe flexion: 5  Great toe extension: 5    Tests     Lumbar     Left   Positive slump test.   Negative passive SLR.     Right   Negative passive SLR and slump test.     General Comments:      Cervical/Thoracic Comments  G5 lumbar and thoracic no cavitation and no relief, Pt has major muscle guarding.              Precautions: High BP, Chronic thoracic and lumbar pain     POC expires Unit limit Auth Expiration date PT/OT/ST + Visit Limit?   12/16/24 BOMN N/A BOMN                           Visit/Unit Tracking  AUTH Status:  Date 9/23              N/A Used 1               Remaining                  HEP: [id number]   Manuals 9/23            Thoracic G5 JMK            Lumbar G 5 JMK                                      Neuro Re-Ed                                                                                                        Ther Ex             Hep education  8'                                                                                                        Ther Activity                                       Gait Training                                       Modalities

## 2024-09-27 ENCOUNTER — HOSPITAL ENCOUNTER (OUTPATIENT)
Dept: MRI IMAGING | Facility: HOSPITAL | Age: 74
End: 2024-09-27
Payer: MEDICARE

## 2024-09-27 DIAGNOSIS — M54.16 LUMBAR RADICULOPATHY: ICD-10-CM

## 2024-09-27 PROCEDURE — 72148 MRI LUMBAR SPINE W/O DYE: CPT

## 2024-09-30 ENCOUNTER — OFFICE VISIT (OUTPATIENT)
Dept: PHYSICAL THERAPY | Facility: CLINIC | Age: 74
End: 2024-09-30
Payer: MEDICARE

## 2024-09-30 DIAGNOSIS — M54.16 LUMBAR RADICULOPATHY: Primary | ICD-10-CM

## 2024-09-30 DIAGNOSIS — M53.3 SI (SACROILIAC) JOINT DYSFUNCTION: ICD-10-CM

## 2024-09-30 DIAGNOSIS — G89.29 CHRONIC RIGHT-SIDED THORACIC BACK PAIN: ICD-10-CM

## 2024-09-30 DIAGNOSIS — M54.6 CHRONIC RIGHT-SIDED THORACIC BACK PAIN: ICD-10-CM

## 2024-09-30 PROCEDURE — 97112 NEUROMUSCULAR REEDUCATION: CPT

## 2024-09-30 PROCEDURE — 97110 THERAPEUTIC EXERCISES: CPT

## 2024-09-30 NOTE — PROGRESS NOTES
"Daily Note     Today's date: 2024  Patient name: Humphrey Kiser  : 1950  MRN: [id number]  Referring provider: Amadou Leggett MD  Dx:   Encounter Diagnosis     ICD-10-CM    1. Lumbar radiculopathy  M54.16 Ambulatory referral to Physical Therapy      2. SI (sacroiliac) joint dysfunction  M53.3 Ambulatory referral to Physical Therapy      3. Chronic right-sided thoracic back pain  M54.6     G89.29         Start Time: 933  Stop Time:   Total time in clinic (min): 38 minutes    Subjective: Pt reports that he attempted the HEP and that some exercises were a little bothersome. Pt reports he is sore today.       Objective: See treatment diary below      Assessment: Tolerated treatment well. Initiated strengthening program, pt required cueing for all exercises. Pt reported minimal to no symptom irritability in lumbar spine during exercises today. Advance Pt NV, increase resistance for para scapular exercises NV. Patient demonstrated fatigue post treatment, exhibited good technique with therapeutic exercises, and would benefit from continued PT for increased ROM, increased strength, and decreased symptom irritability.       Plan: Continue per plan of care.      Precautions: High BP, Chronic thoracic and lumbar pain     POC expires Unit limit Auth Expiration date PT/OT/ST + Visit Limit?   24 BOMN N/A BOMN                           Visit/Unit Tracking  AUTH Status:  Date              N/A Used 1 2              Remaining                  HEP: [id number]   Manuals            Thoracic G5 JMK            Lumbar G 5 JMK                                      Neuro Re-Ed             Hip adduction Iso  2x10 5\"           Hip abduction Iso  2x10 5\"           Bridging  2x10 3\"           SLR  2x10 ea           Side lying hip abduction  2x10 ea 3\"           Multifidus Press  2x10 ea 20#                        Ther Ex             Hep education  8'            Bike  5'           Squat  2x10 10#         "   Chika Row  2x10 25#           [place] extension  2x10 15#                                                  Ther Activity                                       Gait Training                                       Modalities

## 2024-10-02 ENCOUNTER — OFFICE VISIT (OUTPATIENT)
Dept: PHYSICAL THERAPY | Facility: CLINIC | Age: 74
End: 2024-10-02
Payer: MEDICARE

## 2024-10-02 DIAGNOSIS — M54.50 LUMBAR PAIN WITH RADIATION DOWN LEFT LEG: ICD-10-CM

## 2024-10-02 DIAGNOSIS — M54.6 CHRONIC RIGHT-SIDED THORACIC BACK PAIN: ICD-10-CM

## 2024-10-02 DIAGNOSIS — M54.16 LUMBAR RADICULOPATHY: Primary | ICD-10-CM

## 2024-10-02 DIAGNOSIS — M53.3 SI (SACROILIAC) JOINT DYSFUNCTION: ICD-10-CM

## 2024-10-02 DIAGNOSIS — G89.29 CHRONIC RIGHT-SIDED THORACIC BACK PAIN: ICD-10-CM

## 2024-10-02 DIAGNOSIS — M79.605 LUMBAR PAIN WITH RADIATION DOWN LEFT LEG: ICD-10-CM

## 2024-10-02 PROCEDURE — 97112 NEUROMUSCULAR REEDUCATION: CPT

## 2024-10-02 PROCEDURE — 97110 THERAPEUTIC EXERCISES: CPT

## 2024-10-02 NOTE — PROGRESS NOTES
"Daily Note     Today's date: 10/2/2024  Patient name: Humphrey Kiser  : 1950  MRN: [id number]  Referring provider: Amadou Leggett MD  Dx:   Encounter Diagnosis     ICD-10-CM    1. Lumbar radiculopathy  M54.16       2. SI (sacroiliac) joint dysfunction  M53.3       3. Chronic right-sided thoracic back pain  M54.6     G89.29       4. Lumbar pain with radiation down left leg  M54.50     M79.605         Start Time: 1059  Stop Time: 1140  Total time in clinic (min): 41 minutes    Subjective: Pt reports that his quads were very sore following last session. Pt reports feeling okay today.       Objective: See treatment diary below      Assessment: Tolerated treatment well. Pt was able to increase the volume of exercise w/o increased symptom irritability. Pt had increased shortness of breath following higher rep ranges. Pt reported no pain following today's session. Patient demonstrated fatigue post treatment, exhibited good technique with therapeutic exercises, and would benefit from continued PT for increased ROM, increased strength, and decreased symptom irritability.       Plan: Continue per plan of care.      Precautions: High BP, Chronic thoracic and lumbar pain     POC expires Unit limit Auth Expiration date PT/OT/ST + Visit Limit?   24 BOMN N/A BOMN                           Visit/Unit Tracking  AUTH Status:  Date  10/2            N/A Used 1 2 3             Remaining                  HEP: [id number]   Manuals  10/2          Thoracic G5 JMK            Lumbar G 5 JMK                                      Neuro Re-Ed             Hip adduction Iso  2x10 5\" 3x10 5\"          Hip abduction Iso  2x10 5\"           Bridging  2x10 3\" 3x10 3\"          SLR  2x10 ea 3x10 ea          Side lying hip abduction  2x10 ea 3\" 3x10 ea 3\"          Multifidus Press  2x10 ea 20# 2x15 ea 20#                       Ther Ex             Hep education  8'            Bike  5' 5'          Squat  2x10 10# 3x10         "   Chika Row  2x10 25# 3x10 25#          Chika extension  2x10 15# 3x10 15#          Amanda   3x10 10#                                    Ther Activity                                       Gait Training                                       Modalities

## 2024-10-07 DIAGNOSIS — N40.1 BENIGN PROSTATIC HYPERPLASIA WITH URINARY FREQUENCY: ICD-10-CM

## 2024-10-07 DIAGNOSIS — R35.0 BENIGN PROSTATIC HYPERPLASIA WITH URINARY FREQUENCY: ICD-10-CM

## 2024-10-07 RX ORDER — TAMSULOSIN HYDROCHLORIDE 0.4 MG/1
0.4 CAPSULE ORAL
Qty: 90 CAPSULE | Refills: 1 | Status: SHIPPED | OUTPATIENT
Start: 2024-10-07

## 2024-10-08 ENCOUNTER — APPOINTMENT (OUTPATIENT)
Dept: PHYSICAL THERAPY | Facility: CLINIC | Age: 74
End: 2024-10-08
Payer: MEDICARE

## 2024-10-10 ENCOUNTER — APPOINTMENT (OUTPATIENT)
Dept: PHYSICAL THERAPY | Facility: CLINIC | Age: 74
End: 2024-10-10
Payer: MEDICARE

## 2024-10-15 ENCOUNTER — OFFICE VISIT (OUTPATIENT)
Dept: PHYSICAL THERAPY | Facility: CLINIC | Age: 74
End: 2024-10-15
Payer: MEDICARE

## 2024-10-15 DIAGNOSIS — M54.50 LUMBAR PAIN WITH RADIATION DOWN LEFT LEG: ICD-10-CM

## 2024-10-15 DIAGNOSIS — G89.29 CHRONIC RIGHT-SIDED THORACIC BACK PAIN: ICD-10-CM

## 2024-10-15 DIAGNOSIS — M79.605 LUMBAR PAIN WITH RADIATION DOWN LEFT LEG: ICD-10-CM

## 2024-10-15 DIAGNOSIS — M54.16 LUMBAR RADICULOPATHY: Primary | ICD-10-CM

## 2024-10-15 DIAGNOSIS — M54.6 CHRONIC RIGHT-SIDED THORACIC BACK PAIN: ICD-10-CM

## 2024-10-15 DIAGNOSIS — M53.3 SI (SACROILIAC) JOINT DYSFUNCTION: ICD-10-CM

## 2024-10-15 PROCEDURE — 97110 THERAPEUTIC EXERCISES: CPT

## 2024-10-15 PROCEDURE — 97112 NEUROMUSCULAR REEDUCATION: CPT

## 2024-10-15 NOTE — PROGRESS NOTES
"Daily Note     Today's date: 10/15/2024  Patient name: Humphrey Kiser  : 1950  MRN: [id number]  Referring provider: Amadou Leggett MD  Dx:   Encounter Diagnosis     ICD-10-CM    1. Lumbar radiculopathy  M54.16       2. SI (sacroiliac) joint dysfunction  M53.3       3. Chronic right-sided thoracic back pain  M54.6     G89.29       4. Lumbar pain with radiation down left leg  M54.50     M79.605         Start Time: 1145  Stop Time: 1228  Total time in clinic (min): 43 minutes    Subjective: Pt reports that he has not been consistent w/ his HEP and is happy to start therapy 2 times a week going forward since he has seen small benefits so far.       Objective: See treatment diary below      Assessment: Tolerated treatment well. Pt reports that his L sided sciatica has been better since starting therapy. Advance Pt NV in resistance to further challenge Pt. Added side lying hip adduction, Pt required heavy cueing for exercise. Patient demonstrated fatigue post treatment, exhibited good technique with therapeutic exercises, and would benefit from continued PT for increased ROM, increased strength, and decreased symptom irritability.       Plan: Continue per plan of care.      Precautions: High BP, Chronic thoracic and lumbar pain     POC expires Unit limit Auth Expiration date PT/OT/ST + Visit Limit?   24 BOMN N/A BOMN                           Visit/Unit Tracking  AUTH Status:  Date 9/23 9/30 10/2 10/15           N/A Used 1 2 3 4            Remaining                  HEP: [id number]   Manuals 9/23 9/30 10/2 10/15         Thoracic G5 JMK            Lumbar G 5 JMK                                      Neuro Re-Ed             Hip adduction Iso  2x10 5\" 3x10 5\"          Hip abduction Iso  2x10 5\"           Bridging  2x10 3\" 3x10 3\" 3x10 3\"         SLR  2x10 ea 3x10 ea 3x10 ea         Side lying hip abduction  2x10 ea 3\" 3x10 ea 3\" 3x10 ea 3\"         Multifidus Press  2x10 ea 20# 2x15 ea 20# 2x15 ea 20#       " "  Side lying hip adduction    2x10 ea 3\"         Ther Ex             Hep education  8'            Bike  5' 5' 5'         Squat  2x10 10# 3x10  3x10         Chika Row  2x10 25# 3x10 25# 3x10 25#         Chika extension  2x10 15# 3x10 15# 3x10 15#         Robberies   3x10 10# 3x10 10#                                   Ther Activity                                       Gait Training                                       Modalities                                            "

## 2024-10-17 ENCOUNTER — APPOINTMENT (OUTPATIENT)
Dept: PHYSICAL THERAPY | Facility: CLINIC | Age: 74
End: 2024-10-17
Payer: MEDICARE

## 2024-10-21 ENCOUNTER — OFFICE VISIT (OUTPATIENT)
Dept: OBGYN CLINIC | Facility: HOSPITAL | Age: 74
End: 2024-10-21
Payer: MEDICARE

## 2024-10-21 VITALS
WEIGHT: 251.32 LBS | BODY MASS INDEX: 33.31 KG/M2 | HEIGHT: 73 IN | DIASTOLIC BLOOD PRESSURE: 76 MMHG | HEART RATE: 85 BPM | SYSTOLIC BLOOD PRESSURE: 125 MMHG

## 2024-10-21 DIAGNOSIS — M53.3 SI (SACROILIAC) JOINT DYSFUNCTION: Primary | ICD-10-CM

## 2024-10-21 PROCEDURE — 99213 OFFICE O/P EST LOW 20 MIN: CPT | Performed by: ORTHOPAEDIC SURGERY

## 2024-10-21 NOTE — PROGRESS NOTES
Assessment & Plan/Medical Decision Makin y.o. male with Back Pain, Left Radicular Leg Pain, and Left Gluteal Pain and imaging findings most notable for lumbar spondylosis  and L4-5 degenerative spondylolisthesis         The clinical, physical and imaging findings were reviewed with the patient.  Humphrey  has a constellation of findings consistent with Lumbar Radiculopathy and Sacroiliac Joint Dysfunction in the setting of lumbar degenerative disease.      Fortunately patient remains neurologically intact and functional. Physical exam showing +TTP left SI joint, +TTP left IT band region.  We discussed the treatment options including physical therapy, at home exercises, activity modifications, chiropractic medicine, oral medications, interventional spine procedures.  At this time recommend continued conservative treatments.    Continue with physical therapy to work on core strengthening, lumbar ROM, strengthening, and stretching exercises. Maintain at home exercises and stretches.  Did discuss role of CSI to target left SI joint. Patient wishes to continue with physical therapy at this time given some benefit. Can consider referral to spine & pain management for injection if pain persists.    Patient instructed to return to office/ER sooner if symptoms are not improving, getting worse, or new worrisome/neurologic symptoms arise.  Patient will follow up if pain persists.     Subjective:      Chief Complaint: Back Pain    HPI:  Humphrey Kiser is a 74 y.o. male presenting for initial visit with chief complaint of back pain. Reports ongoing left gluteal region pain with radiation into lateral left thigh. Pain worse when going from a seated to standing position and with prolonged sitting and standing. Worse when walking on uneven ground. Improves when moving, noting it feels like it loosens up. Also with increased back pain with back extension. Denies lower extremity weakness. Does report history of peripheral  Voicemail left for Rupinder asking for a call back to find out if she was referred by her PCP to Endocrinology and Ophthalmology. We would like to have this in process before scheduling with Dr. Soto or Dr. Barba. Office number left for a return call.    neuropathy, unchanged bilateral foot numbness/tingling. Denies any pati trauma. Denies fever or chills, no night sweats. Denies any bladder or bowel changes.      Intermittent neck pain with occasional hand numbness/tingling. Denies upper extremity weakness. Denies dropping items frequently or changes in fine motor skills/dexterity. Denies balance issues.    Denies heart or lung disease. Denies diabetes or kidney disease.    Conservative therapy includes the following:   Medications: gabapentin with some relief, advil/aleve    Injections: denies     Physical Therapy: denies  Chiropractic Medicine: has not attempted  Accupunture/Massage Therapy: has not attempted   These therapeutic modalities were ineffective at providing sustained pain relief/functional improvement.     Nicotine dependent: denies  Occupation: retired,   Living situation: Lives with family   ADLs: patient is able to perform     Update HPI on 10/21/2024:  Patient is here for follow up lumbar MRI review. He has been going to physical therapy with some benefit. Reports left lower extremity pain has improved. Continues with low back back and left gluteal region pain, worse with prolonged sitting and when going from seated to standing or standing to seated position. Notes he is able to go up/down steps easier since starting physical therapy. Denies weakness. Denies significant issue with ambulation. Denies significant right sided symptoms.    Objective:     Family History   Problem Relation Age of Onset    Cancer Mother     Cancer Father        Past Medical History:   Diagnosis Date    Adenomatous polyp of transverse colon 12/9/2021    10/26/20 Tubular adenoma with focal high grade dysplasia. Also transverse tubular adenoma, Splenic flexure tubular adenoma    Arthritis     Hyperlipidemia     Hypertension        Current Outpatient Medications   Medication Sig Dispense Refill    aspirin (ECOTRIN LOW STRENGTH) 81 mg EC tablet Take 81 mg by  mouth daily      Cholecalciferol 50 MCG (2000 UT) CAPS Take 2,000 Units by mouth daily      esomeprazole (NexIUM) 40 MG capsule Take 40 mg by mouth daily      finasteride (PROSCAR) 5 mg tablet Take 1 tablet (5 mg total) by mouth daily 90 tablet 3    gabapentin (NEURONTIN) 300 mg capsule       metoprolol succinate (TOPROL-XL) 100 mg 24 hr tablet Take 100 mg by mouth daily      Multiple Vitamins-Minerals (PreserVision AREDS) CAPS Take 1 capsule by mouth daily       olmesartan (BENICAR) 40 mg tablet Take 1 tablet by mouth daily      rosuvastatin (CRESTOR) 10 MG tablet Take 10 mg by mouth daily      tamsulosin (FLOMAX) 0.4 mg Take 1 capsule by mouth daily with dinner. 90 capsule 1    tolterodine (DETROL LA) 4 mg 24 hr capsule Take 1 capsule (4 mg total) by mouth daily 90 capsule 3    traZODone (DESYREL) 100 mg tablet Take 1 tablet (100 mg total) by mouth daily at bedtime 90 tablet 3    betamethasone, augmented, (DIPROLENE) 0.05 % ointment Apply 1 application topically daily as needed For rosacea  (Patient not taking: Reported on 3/28/2024)      fluticasone (FLONASE) 50 mcg/act nasal spray 1 spray 2 (two) times a day Shake liquid and spray (Patient not taking: Reported on 3/28/2024)      metroNIDAZOLE (METROGEL) 1 % gel Apply 1 application topically daily as needed (Patient not taking: Reported on 3/28/2024)      Trulance 3 MG TABS Take by mouth daily       No current facility-administered medications for this visit.       Past Surgical History:   Procedure Laterality Date    CHOLECYSTECTOMY  2001    EYE SURGERY         Social History     Socioeconomic History    Marital status: /Civil Union     Spouse name: Not on file    Number of children: 3    Years of education: Not on file    Highest education level: Not on file   Occupational History    Not on file   Tobacco Use    Smoking status: Never    Smokeless tobacco: Never   Vaping Use    Vaping status: Never Used   Substance and Sexual Activity    Alcohol use: Yes  "    Comment: occ    Drug use: Never    Sexual activity: Not on file   Other Topics Concern    Not on file   Social History Narrative    Not on file     Social Determinants of Health     Financial Resource Strain: Not on file   Food Insecurity: Not on file   Transportation Needs: Not on file   Physical Activity: Not on file   Stress: Not on file   Social Connections: Not on file   Intimate Partner Violence: Not on file   Housing Stability: Not on file       No Known Allergies    Review of Systems  General- denies fever/chills  HEENT- denies hearing loss or sore throat  Eyes- denies eye pain or visual disturbances, denies red eyes  Respiratory- denies cough or SOB  Cardio- denies chest pain or palpitations  GI- denies abdominal pain  Endocrine- denies urinary frequency  Urinary- denies pain with urination  Musculoskeletal- Negative except noted above  Skin- denies rashes or wounds  Neurological- denies dizziness or headache  Psychiatric- denies anxiety or difficulty concentrating    Physical Exam  /76   Pulse 85   Ht 6' 1\" (1.854 m)   Wt 114 kg (251 lb 5.2 oz)   BMI 33.16 kg/m²     General/Constitutional: No apparent distress: well-nourished and well developed.  Lymphatic: No appreciable lymphadenopathy  Respiratory: Non-labored breathing  Vascular: No edema, swelling or tenderness, except as noted in detailed exam.  Integumentary: No impressive skin lesions present, except as noted in detailed exam.  Psych: Normal mood and affect, oriented to person, place and time.  MSK: normal other than stated in HPI and exam  Gait & balance: no evidence of myelopathic gait, ambulates Independently     Lumbar spine range of motion:  -Forward flexion to 90  -Extension to neutral  -Lateral bend 25 right, 25 left  -Rotation 25 right, 25 left  There tenderness with palpation along lumbar paraspinal musculature, no midline tenderness     Neurologic:  Lower Extremity Motor Function    Right  Left    Iliopsoas  5/5  5/5  " "  Quadriceps 5/5 5/5   Tibialis anterior  5/5 5/5    EHL  5/5 5/5    Gastroc. muscle  5/5 5/5    Heel rise  5/5 5/5    Toe rise  5/5 5/5      Sensory: light touch is intact to bilateral upper and lower extremities     Reflexes:    Right Left   Patellar 1+ 1+   Achilles 1+ 1+   Babinski neg neg     Other tests:  Straight Leg Raise: negative  Jake SI: positive  DOC SI: negative  Greater troch: no tenderness   Internal/external hip ROM: intact, no pain   Flexion/extension knee ROM: intact, no pain   Vascular: WWP extremities, 2+DP bilateral      Diagnostic Tests   IMAGING: I have personally reviewed the images and these are my findings:  Lumbar Spine X-rays from 9/9/2024: multi level lumbar spondylosis with loss of disc height, osteophyte formation and facet hypertrophy, L4-5 degenerative spondylolisthesis, no appreciated lytic/blastic lesions, no obvious instability    Lumbar MRI from 10/15/2024: multi level lumbar disc degeneration with disc desiccation, loss of disc height, facet and ligamentum hypertrophy, no significant central, lateral recess, foraminal stenosis     Electronic Medical Records were reviewed including office notes, imaging studies    Procedures, if performed today     None performed       Portions of the record may have been created with voice recognition software.  Occasional wrong word or \"sound a like\" substitutions may have occurred due to the inherent limitations of voice recognition software.  Read the chart carefully and recognize, using context, where substitutions have occurred.  "

## 2024-10-22 ENCOUNTER — OFFICE VISIT (OUTPATIENT)
Dept: PHYSICAL THERAPY | Facility: CLINIC | Age: 74
End: 2024-10-22
Payer: MEDICARE

## 2024-10-22 DIAGNOSIS — M54.6 CHRONIC RIGHT-SIDED THORACIC BACK PAIN: ICD-10-CM

## 2024-10-22 DIAGNOSIS — M54.16 LUMBAR RADICULOPATHY: Primary | ICD-10-CM

## 2024-10-22 DIAGNOSIS — M53.3 SI (SACROILIAC) JOINT DYSFUNCTION: ICD-10-CM

## 2024-10-22 DIAGNOSIS — M54.50 LUMBAR PAIN WITH RADIATION DOWN LEFT LEG: ICD-10-CM

## 2024-10-22 DIAGNOSIS — G89.29 CHRONIC RIGHT-SIDED THORACIC BACK PAIN: ICD-10-CM

## 2024-10-22 DIAGNOSIS — M79.605 LUMBAR PAIN WITH RADIATION DOWN LEFT LEG: ICD-10-CM

## 2024-10-22 PROCEDURE — 97112 NEUROMUSCULAR REEDUCATION: CPT

## 2024-10-22 PROCEDURE — 97110 THERAPEUTIC EXERCISES: CPT

## 2024-10-22 PROCEDURE — 97530 THERAPEUTIC ACTIVITIES: CPT

## 2024-10-22 NOTE — PROGRESS NOTES
"Daily Note     Today's date: 10/22/2024  Patient name: Humphrey Kiser  : 1950  MRN: [id number]  Referring provider: Amadou Leggett MD  Dx:   Encounter Diagnosis     ICD-10-CM    1. Lumbar radiculopathy  M54.16       2. SI (sacroiliac) joint dysfunction  M53.3       3. Chronic right-sided thoracic back pain  M54.6     G89.29       4. Lumbar pain with radiation down left leg  M54.50     M79.605                      Subjective: Pt reports he is feeling better overall, but he has been more compliant with HEP and is a little sore.      Objective: See treatment diary below      Assessment: Some difficulty maintaining proper set up for sidelying hip abduction.  Does have a bit of trunk lean with unilateral carrying.  Patient performs charted interventions without significant complications.  Did not use Chika today due to availability.  Pt would benefit from continued PT.      Plan: Continue per plan of care.      Precautions: High BP, Chronic thoracic and lumbar pain     POC expires Unit limit Auth Expiration date PT/OT/ST + Visit Limit?   24 BOMN N/A BOMN                           Visit/Unit Tracking  AUTH Status:  Date 9/23 9/30 10/2 10/15 10/22          N/A Used 1 2 3 4 5           Remaining                  HEP: [id number]   Manuals 9/23 9/30 10/2 10/15 10/22        Thoracic G5 JMK            Lumbar G 5 JMK                                      Neuro Re-Ed             Hip adduction Iso  2x10 5\" 3x10 5\"          Hip abduction Iso  2x10 5\"           Bridging  2x10 3\" 3x10 3\" 3x10 3\" 3x10 3\"        SLR  2x10 ea 3x10 ea 3x10 ea 3x10 ea        Side lying hip abduction  2x10 ea 3\" 3x10 ea 3\" 3x10 ea 3\" 3x10 3\"        Multifidus Press  2x10 ea 20# 2x15 ea 20# 2x15 ea 20# 2x15 Blk TB        Side lying hip adduction    2x10 ea 3\" 2x10 ea 3\"        Ther Ex             Hep education  8'            Bike  5' 5' 5' 5'        Squat  2x10 10# 3x10  3x10 10# 2x10        [place] Row  2x10 25# 3x10 25# 3x10 25# 3X10 " Blk/RTB         [place] extension  2x10 15# 3x10 15# 3x10 15# 3x10 blk/RTB        Robberies   3x10 10# 3x10 10# BTB 2x10                                  Ther Activity             U/l farmer's carry     15# 3 laps ea        Dirty baby carry     5# kb 3 laps        Gait Training                                       Modalities

## 2024-10-24 ENCOUNTER — OFFICE VISIT (OUTPATIENT)
Dept: PHYSICAL THERAPY | Facility: CLINIC | Age: 74
End: 2024-10-24
Payer: MEDICARE

## 2024-10-24 DIAGNOSIS — M54.50 LUMBAR PAIN WITH RADIATION DOWN LEFT LEG: ICD-10-CM

## 2024-10-24 DIAGNOSIS — M54.16 LUMBAR RADICULOPATHY: Primary | ICD-10-CM

## 2024-10-24 DIAGNOSIS — M79.605 LUMBAR PAIN WITH RADIATION DOWN LEFT LEG: ICD-10-CM

## 2024-10-24 DIAGNOSIS — M53.3 SI (SACROILIAC) JOINT DYSFUNCTION: ICD-10-CM

## 2024-10-24 DIAGNOSIS — M54.6 CHRONIC RIGHT-SIDED THORACIC BACK PAIN: ICD-10-CM

## 2024-10-24 DIAGNOSIS — G89.29 CHRONIC RIGHT-SIDED THORACIC BACK PAIN: ICD-10-CM

## 2024-10-24 PROCEDURE — 97112 NEUROMUSCULAR REEDUCATION: CPT

## 2024-10-24 PROCEDURE — 97110 THERAPEUTIC EXERCISES: CPT

## 2024-10-24 NOTE — PROGRESS NOTES
"Daily Note     Today's date: 10/24/2024  Patient name: Humphrey Kiser  : 1950  MRN: [id number]  Referring provider: Amadou Leggett MD  Dx:   Encounter Diagnosis     ICD-10-CM    1. Lumbar radiculopathy  M54.16       2. SI (sacroiliac) joint dysfunction  M53.3       3. Chronic right-sided thoracic back pain  M54.6     G89.29       4. Lumbar pain with radiation down left leg  M54.50     M79.605         Start Time: 1059  Stop Time: 1145  Total time in clinic (min): 46 minutes    Subjective: Pt reports he was sore following last session in bilateral quads. Pt reported the squats w/ eight were too challenging and that he would like to reduce resistance today.       Objective: See treatment diary below      Assessment: Pt reported decreased in lumbar tension following lumbar G5 mob. Pt still requires heavy cueing for side lying exercises. Pt did not report any pain during exercises, but had major fatigue w/ squats to the chair. Pt would benefit from continued PT for increased strength and decreased symptom irritability.       Plan: Continue per plan of care.      Precautions: High BP, Chronic thoracic and lumbar pain     POC expires Unit limit Auth Expiration date PT/OT/ST + Visit Limit?   24 BOMN N/A BOMN                           Visit/Unit Tracking  AUTH Status:  Date 9/23 9/30 10/2 10/15 10/22 10/24         N/A Used 1 2 3 4 5 6          Remaining                  HEP: [id number]   Manuals 9/23 9/30 10/2 10/15 10/22 10/24       Thoracic G5 JMK            Lumbar G 5 JMK     JMK                                 Neuro Re-Ed             Hip adduction Iso  2x10 5\" 3x10 5\"          Hip abduction Iso  2x10 5\"           Bridging  2x10 3\" 3x10 3\" 3x10 3\" 3x10 3\" 3x12 3\"       SLR  2x10 ea 3x10 ea 3x10 ea 3x10 ea 3x15 ea       Side lying hip abduction  2x10 ea 3\" 3x10 ea 3\" 3x10 ea 3\" 3x10 3\" 3x12 3\"       Multifidus Press  2x10 ea 20# 2x15 ea 20# 2x15 ea 20# 2x15 Blk TB        Side lying hip adduction    2x10 ea " "3\" 2x10 ea 3\" 3x12 3\"       Ther Ex             Hep education  8'            Bike  5' 5' 5' 5' 5'       Squat  2x10 10# 3x10  3x10 10# 2x10 1x12  1x10       Chika Row  2x10 25# 3x10 25# 3x10 25# 3X10 Blk/RTB  3x15 25#       Chika extension  2x10 15# 3x10 15# 3x10 15# 3x10 blk/RTB 3x15 15#       Robberies   3x10 10# 3x10 10# BTB 2x10                                  Ther Activity             U/l farmer's carry     15# 3 laps ea 15# 5 laps ea       Dirty baby carry     5# kb 3 laps 5# kb 5 laps       Gait Training                                       Modalities                                         "

## 2024-10-28 ENCOUNTER — OFFICE VISIT (OUTPATIENT)
Dept: PHYSICAL THERAPY | Facility: CLINIC | Age: 74
End: 2024-10-28
Payer: MEDICARE

## 2024-10-28 DIAGNOSIS — M53.3 SI (SACROILIAC) JOINT DYSFUNCTION: ICD-10-CM

## 2024-10-28 DIAGNOSIS — M79.605 LUMBAR PAIN WITH RADIATION DOWN LEFT LEG: ICD-10-CM

## 2024-10-28 DIAGNOSIS — M54.6 CHRONIC RIGHT-SIDED THORACIC BACK PAIN: ICD-10-CM

## 2024-10-28 DIAGNOSIS — G89.29 CHRONIC RIGHT-SIDED THORACIC BACK PAIN: ICD-10-CM

## 2024-10-28 DIAGNOSIS — M54.50 LUMBAR PAIN WITH RADIATION DOWN LEFT LEG: ICD-10-CM

## 2024-10-28 DIAGNOSIS — M54.16 LUMBAR RADICULOPATHY: Primary | ICD-10-CM

## 2024-10-28 PROCEDURE — 97110 THERAPEUTIC EXERCISES: CPT

## 2024-10-28 PROCEDURE — 97112 NEUROMUSCULAR REEDUCATION: CPT

## 2024-10-28 PROCEDURE — 97530 THERAPEUTIC ACTIVITIES: CPT

## 2024-10-28 NOTE — PROGRESS NOTES
"Daily Note     Today's date: 10/28/2024  Patient name: Humphrey Kiser  : 1950  MRN: [id number]  Referring provider: Amadou Leggett MD  Dx:   Encounter Diagnosis     ICD-10-CM    1. Lumbar radiculopathy  M54.16       2. SI (sacroiliac) joint dysfunction  M53.3       3. Chronic right-sided thoracic back pain  M54.6     G89.29       4. Lumbar pain with radiation down left leg  M54.50     M79.605                      Subjective: Pt reports he was working on his truck over the weekend.  He reports his lower back is sore, stiff, and achy.  Pt states his low back continues to be painful with STS transfers.      Objective: See treatment diary below      Assessment: Deferred squats today secondary to LBP with this.  Patient performs charted interventions without significant exacerbation in sx.  Still remains challenged with hip strengthening.  Pt would benefit from continued PT.      Plan: Continue per plan of care.      Precautions: High BP, Chronic thoracic and lumbar pain     POC expires Unit limit Auth Expiration date PT/OT/ST + Visit Limit?   24 BOMN N/A BOMN                           Visit/Unit Tracking  AUTH Status:  Date 9/23 9/30 10/2 10/15 10/22 10/24 10/28        N/A Used 1 2 3 4 5 6 7         Remaining                  HEP: [id number]   Manuals 9/23 9/30 10/2 10/15 10/22 10/24 10/28      Thoracic G5 JMK            Lumbar G 5 JMK     JMK                                 Neuro Re-Ed             Hip adduction Iso  2x10 5\" 3x10 5\"          Hip abduction Iso  2x10 5\"           Bridging  2x10 3\" 3x10 3\" 3x10 3\" 3x10 3\" 3x12 3\" 3x12 3\"      SLR  2x10 ea 3x10 ea 3x10 ea 3x10 ea 3x15 ea 3x15 ea      Side lying hip abduction  2x10 ea 3\" 3x10 ea 3\" 3x10 ea 3\" 3x10 3\" 3x12 3\" 3x12 3\"      Multifidus Press  2x10 ea 20# 2x15 ea 20# 2x15 ea 20# 2x15 Blk TB  2x15 20#      Side lying hip adduction    2x10 ea 3\" 2x10 ea 3\" 3x12 3\" 3x12 3\"      Ther Ex             Hep education  8'            Bike  5' 5' 5' 5' 5' 5'  "     Squat  2x10 10# 3x10  3x10 10# 2x10 1x12  1x10 NV?      [place] [address]  2x10 25# 3x10 25# 3x10 25# 3X10 Blk/RTB  3x15 25# 3x15 25#      Chika extension  2x10 15# 3x10 15# 3x10 15# 3x10 blk/RTB 3x15 15# 3x15 15#      Robberies   3x10 10# 3x10 10# BTB 2x10  3x10 10#                                Ther Activity             U/l farmer's carry     15# 3 laps ea 15# 5 laps ea 15# 5 laps ea      Dirty baby carry     5# kb 3 laps 5# kb 5 laps 5# KB 5 laps      Gait Training                                       Modalities

## 2024-10-29 ENCOUNTER — APPOINTMENT (OUTPATIENT)
Dept: PHYSICAL THERAPY | Facility: CLINIC | Age: 74
End: 2024-10-29
Payer: MEDICARE

## 2024-10-30 ENCOUNTER — APPOINTMENT (OUTPATIENT)
Dept: PHYSICAL THERAPY | Facility: CLINIC | Age: 74
End: 2024-10-30
Payer: MEDICARE

## 2024-10-31 ENCOUNTER — APPOINTMENT (OUTPATIENT)
Dept: PHYSICAL THERAPY | Facility: CLINIC | Age: 74
End: 2024-10-31
Payer: MEDICARE

## 2024-11-07 ENCOUNTER — APPOINTMENT (OUTPATIENT)
Dept: PHYSICAL THERAPY | Facility: CLINIC | Age: 74
End: 2024-11-07
Payer: MEDICARE

## 2024-11-12 ENCOUNTER — OFFICE VISIT (OUTPATIENT)
Dept: PHYSICAL THERAPY | Facility: CLINIC | Age: 74
End: 2024-11-12
Payer: MEDICARE

## 2024-11-12 DIAGNOSIS — M79.605 LUMBAR PAIN WITH RADIATION DOWN LEFT LEG: ICD-10-CM

## 2024-11-12 DIAGNOSIS — M54.6 CHRONIC RIGHT-SIDED THORACIC BACK PAIN: ICD-10-CM

## 2024-11-12 DIAGNOSIS — M53.3 SI (SACROILIAC) JOINT DYSFUNCTION: ICD-10-CM

## 2024-11-12 DIAGNOSIS — M54.50 LUMBAR PAIN WITH RADIATION DOWN LEFT LEG: ICD-10-CM

## 2024-11-12 DIAGNOSIS — M54.16 LUMBAR RADICULOPATHY: Primary | ICD-10-CM

## 2024-11-12 DIAGNOSIS — G89.29 CHRONIC RIGHT-SIDED THORACIC BACK PAIN: ICD-10-CM

## 2024-11-12 PROCEDURE — 97110 THERAPEUTIC EXERCISES: CPT

## 2024-11-12 PROCEDURE — 97112 NEUROMUSCULAR REEDUCATION: CPT

## 2024-11-12 NOTE — PROGRESS NOTES
"Daily Note     Today's date: 2024  Patient name: Humphrey Kiser  : 1950  MRN: [id number]  Referring provider: Amadou Leggett MD  Dx:   Encounter Diagnosis     ICD-10-CM    1. Lumbar radiculopathy  M54.16       2. SI (sacroiliac) joint dysfunction  M53.3       3. Chronic right-sided thoracic back pain  M54.6     G89.29       4. Lumbar pain with radiation down left leg  M54.50     M79.605         Start Time: 1144  Stop Time: 1222  Total time in clinic (min): 38 minutes    Subjective: Pt reports he was sore from cleaning his gutters over the weekend, but overall his low back is feeling better. Pt reports his sciatica is better as well.       Objective: See treatment diary below      Assessment: Pt tolerated treatment well. Pt did not have a cavitation w/ lumbar G5 mobilization, reported minimal increase in lumbar motion. Pt requested to defer squats and therapeutic activities today. Pt still requires frequent cueing for exercises. No reports of exacerbated lumbar or thoracic symptom irritability. Pt would benefit from continued PT for increased strength, increased mobility, and decreased symptom irritability.        Plan: Continue per plan of care.      Precautions: High BP, Chronic thoracic and lumbar pain     POC expires Unit limit Auth Expiration date PT/OT/ST + Visit Limit?   24 BOMN N/A BOMN                           Visit/Unit Tracking  AUTH Status:  Date 9/23 9/30 10/2 10/15 10/22 10/24 10/28 11/12       N/A Used 1 2 3 4 5 6 7 8        Remaining                  HEP: [id number]   Manuals 9/23 9/30 10/2 10/15 10/22 10/24 10/28 11/12     Thoracic G5 JMK            Lumbar G 5 JMK     JMK  JMK                               Neuro Re-Ed             Hip adduction Iso  2x10 5\" 3x10 5\"          Hip abduction Iso  2x10 5\"           Bridging  2x10 3\" 3x10 3\" 3x10 3\" 3x10 3\" 3x12 3\" 3x12 3\" 3x12 3\"     SLR  2x10 ea 3x10 ea 3x10 ea 3x10 ea 3x15 ea 3x15 ea 3x12 ea     Side lying hip abduction  2x10 ea 3\" " "3x10 ea 3\" 3x10 ea 3\" 3x10 3\" 3x12 3\" 3x12 3\" 3x12 3\"     Multifidus Press  2x10 ea 20# 2x15 ea 20# 2x15 ea 20# 2x15 Blk TB  2x15 20# 2x15 20#     Side lying hip adduction    2x10 ea 3\" 2x10 ea 3\" 3x12 3\" 3x12 3\" 3x12 3\"     Ther Ex             Hep education  8'            Bike  5' 5' 5' 5' 5' 5' 5'     Squat  2x10 10# 3x10  3x10 10# 2x10 1x12  1x10 NV?      [place] Row  2x10 25# 3x10 25# 3x10 25# 3X10 Blk/RTB  3x15 25# 3x15 25# 3x12 28#     Chika extension  2x10 15# 3x10 15# 3x10 15# 3x10 blk/RTB 3x15 15# 3x15 15# 3x12 16#     Robberies   3x10 10# 3x10 10# BTB 2x10  3x10 10# 3x12 10#                               Ther Activity             U/l farmer's carry     15# 3 laps ea 15# 5 laps ea 15# 5 laps ea      Dirty baby carry     5# kb 3 laps 5# kb 5 laps 5# KB 5 laps      Gait Training                                       Modalities                                              "

## 2024-11-19 ENCOUNTER — APPOINTMENT (OUTPATIENT)
Dept: PHYSICAL THERAPY | Facility: CLINIC | Age: 74
End: 2024-11-19
Payer: MEDICARE

## 2024-11-19 ENCOUNTER — OFFICE VISIT (OUTPATIENT)
Age: 74
End: 2024-11-19
Payer: MEDICARE

## 2024-11-19 VITALS
BODY MASS INDEX: 34.24 KG/M2 | DIASTOLIC BLOOD PRESSURE: 72 MMHG | TEMPERATURE: 98 F | WEIGHT: 252.8 LBS | SYSTOLIC BLOOD PRESSURE: 130 MMHG | HEIGHT: 72 IN | HEART RATE: 61 BPM | OXYGEN SATURATION: 96 %

## 2024-11-19 DIAGNOSIS — L71.9 ROSACEA: Primary | ICD-10-CM

## 2024-11-19 DIAGNOSIS — L82.1 SEBORRHEIC KERATOSIS: ICD-10-CM

## 2024-11-19 PROCEDURE — 99204 OFFICE O/P NEW MOD 45 MIN: CPT | Performed by: DERMATOLOGY

## 2024-11-19 RX ORDER — METRONIDAZOLE 7.5 MG/G
GEL TOPICAL 2 TIMES DAILY
Qty: 45 G | Refills: 2 | Status: SHIPPED | OUTPATIENT
Start: 2024-11-19

## 2024-11-19 RX ORDER — ERYTHROMYCIN 5 MG/G
OINTMENT OPHTHALMIC
COMMUNITY
Start: 2024-10-01

## 2024-11-19 RX ORDER — LINACLOTIDE 72 UG/1
CAPSULE, GELATIN COATED ORAL
COMMUNITY
Start: 2024-11-12

## 2024-11-19 NOTE — PATIENT INSTRUCTIONS
ROSACEA    Physical Exam:  Anatomic Location Affected:  face  Morphological Description:  erythema and papules  Pertinent Positives:  Pertinent Negatives:    Additional History of Present Condition:  present for years, used Metrogel and Mometasone in the past    Assessment and Plan:  Based on a thorough discussion of this condition and the management approach to it (including a comprehensive discussion of the known risks, side effects and potential benefits of treatment), the patient (family) agrees to implement the following specific plan:  Metrogel daily    Rosacea is a chronic rash affecting the mid-face including the nose, cheeks, chin, forehead, and eyelids. The incidence is usually greatest between the ages of 30-60 years and is more common in people with fair skin. Common characteristics include redness, telangiectasias, papules and pustules over affected areas. Rosacea may look similar to acne, but there is a lack of comedones. Occasionally the eyes may also be involved in ocular rosacea. In advanced disease, enlargement of the sebaceous glands in the nose, termed rhinophyma, may be present.     Rosacea results in red spots (papules) and sometimes pustules over the face, but unlike acne there are no blackheads, whiteheads, or cystic nodules. Patients often experience increased facial flushing with prominent blood vessels (erythematotelangiectatic rosacea) and dry, sensitive skin. These symptoms are exacerbated by sun exposure, hot or spicy foods, topical steroids and oil-based facial products.     In ocular rosacea, eyelids may be red and sore due to conjunctivitis, keratitis, and episcleritis. If rhinophyma develops due to enlargement of sebaceous glands, the patient may have an enlarged and irregularly shaped nose with prominent pores. In rosacea that is refractory to treatment, patients can develop persistent redness and swelling of the face due to lymphatic obstruction (Morbihan disease).      Distribution around the cheeks may be confused with the malar or “butterfly rash” of lupus. However, the rash of lupus spares the nasal creases and lacks papules and pustules. If signs of photosensitivity, oral ulcers, arthritis, and kidney dysfunction are present then consider referral to a rheumatologist.     There are many potential causes of rosacea including genetic, environmental, vascular, and inflammatory factors. These include, but are not limited to:  Chronic exposure to ultraviolet radiation   Increased immune responses in the form of cathelicidins that promote vessel dilation and infiltration with white blood cells (neutrophils) into the dermis  Increased matrix metalloproteinases such as collagen and elastase that remodel normal tissue may contribute to inflammation of the skin making it thicker and harder  There is some evidence to suggest that increased numbers of demodex mites on patient skin may contribute to rosacea papules     General Treatment Approach   Avoid exacerbating factors such as heat, spicy foods, and alcohol   Use daily SPF30+ sunscreen and other methods of coverage for sun protection  Use water-based make-up   Avoid applying topical steroids to affected areas as they can cause perioral dermatitis and exacerbate rosacea     Topical Treatment Approach  Metronidazole cream or gel by itself or in combination with oral antibiotics for more severe cases  Azelaic acid cream or lotion is effective for mild inflammatory rosacea when applied twice daily to affected areas  Brimonidine gel and oxymetazoline hydrochloride cream can reduce facial redness temporarily   Ivermectin cream can treat papulopustular rosacea by controlling demodex mites and inflammation   Pimecrolimus cream or tacrolimus ointment twice a day for 2-3 months can help reduce inflammation    Oral Treatment Approach  Antibiotics such as doxycycline, minocycline, or erythromycin for 1-3 months  Clonidine and carvedilol can  "help reduce facial flushing and are generally well tolerated. Common side effects include low blood pressure, gastrointestinal upset, dry eyes, blurred vision and low heart rate.   Isotretinoin at low doses can be effective for long term treatment when antibiotics fail. Side effects may make it unsuitable for some patients.   NSAIDs such as diclofenac can help reduce discomfort and redness in the skin.     Procedural/Surgical Treatment Approach   Vascular lasers or intense pulsed light treatment may be used to treat persistent telangiectasia and papulopustular rosacea  Plastic surgery and carbon dioxide lasers may be used to treat rhinophyma     MELANOCYTIC NEVI (\"Moles\")    Melanocytic nevi (\"moles\") are tan or brown, raised or flat areas of the skin which have an increased number of melanocytes. Melanocytes are the cells in our body which make pigment and account for skin color.    Some moles are present at birth (I.e., \"congenital nevi\"), while others come up later in life (i.e., \"acquired nevi\").  The sun can stimulate the body to make more moles.  Sunburns are not the only thing that triggers more moles.  Chronic sun exposure can do it too.     Clinically distinguishing a healthy mole from melanoma may be difficult, even for experienced dermatologists. The \"ABCDE's\" of moles have been suggested as a means of helping to alert a person to a suspicious mole and the possible increased risk of melanoma.  The suggestions for raising alert are as follows:    Asymmetry: Healthy moles tend to be symmetric, while melanomas are often asymmetric.  Asymmetry means if you draw a line through the mole, the two halves do not match in color, size, shape, or surface texture. Asymmetry can be a result of rapid enlargement of a mole, the development of a raised area on a previously flat lesion, scaling, ulceration, bleeding or scabbing within the mole.  Any mole that starts to demonstrate \"asymmetry\" should be examined promptly by " "a board certified dermatologist.     Border: Healthy moles tend to have discrete, even borders.  The border of a melanoma often blends into the normal skin and does not sharply delineate the mole from normal skin.  Any mole that starts to demonstrate \"uneven borders\" should be examined promptly by a board certified dermatologist.     Color: Healthy moles tend to be one color throughout.  Melanomas tend to be made up of different colors ranging from dark black, blue, white, or red.  Any mole that demonstrates a color change should be examined promptly by a board certified dermatologist.     Diameter: Healthy moles tend to be smaller than 0.6 cm in size; an exception are \"congenital nevi\" that can be larger.  Melanomas tend to grow and can often be greater than 0.6 cm (1/4 of an inch, or the size of a pencil eraser). This is only a guideline, and many normal moles may be larger than 0.6 cm without being unhealthy.  Any mole that starts to change in size (small to bigger or bigger to smaller) should be examined promptly by a board certified dermatologist.     Evolving: Healthy moles tend to \"stay the same.\"  Melanomas may often show signs of change or evolution such as a change in size, shape, color, or elevation.  Any mole that starts to itch, bleed, crust, burn, hurt, or ulcerate or demonstrate a change or evolution should be examined promptly by a board certified dermatologist.      Dysplastic Nevi  Dysplastic moles are moles that fit the ABCDE rules of melanoma but are not identified as melanomas when examined under the microscope.  They may indicate an increased risk of melanoma in that person. If there is a family history of melanoma, most experts agree that the person may be at an increased risk for developing a melanoma.  Experts still do not agree on what dysplastic moles mean in patients without a personal or family history of melanoma.  Dysplastic moles are usually larger than common moles and have different " "colors within it with irregular borders. The appearance can be very similar to a melanoma. Biopsies of dysplastic moles may show abnormalities which are different from a regular mole.      Melanoma  Malignant melanoma is a type of skin cancer that can be deadly if it spreads throughout the body. The incidence of melanoma in the [place] is growing faster than any other cancer. Melanoma usually grows near the surface of the skin for a period of time, and then begins to grow deeper into the skin. Once it grows deeper into the skin, the risk of spread to other organs greatly increases. Therefore, early detection and removal of a malignant melanoma may result in a better chance at a complete cure; removal after the tumor has spread may not be as effective, leading to worse clinical outcomes such as death.    The true rate of nevus transformation into a melanoma is unknown. It has been estimated that the lifetime risk for any acquired melanocytic nevus on any 20-year-old individual transforming into melanoma by age 80 is 0.03% (1 in 3,164) for men and 0.009% (1 in 10,800) for women.     The appearance of a \"new mole\" remains one of the most reliable methods for identifying a malignant melanoma.  Occasionally, melanomas appear as rapidly growing, blue-black, dome-shaped bumps within a previous mole or previous area of normal skin.  Other times, melanomas are suspected when a mole suddenly appears or changes. Itching, burning, or pain in a pigmented lesion should increase suspicion, but most patients with early melanoma have no skin discomfort whatsoever.  Melanoma can occur anywhere on the skin, including areas that are difficult for self-examination. Many melanomas are first noticed by other family members.  Suspicious-looking moles may be removed for microscopic examination.       You may be able to prevent death from melanoma by doing two simple things:    Try to avoid unnecessary sun exposure and protect your " "skin when it is exposed to the sun.  People who live near the equator, people who have intermittent exposures to large amounts of sun, and people who have had sunburns in childhood or adolescence have an increased risk for melanoma. Sun sense and vigilant sun protection may be keys to helping to prevent melanoma.  We recommend wearing UPF-rated sun protective clothing and sunglasses whenever possible and applying a moisturizer-sunscreen combination product (SPF 50+) such as Neutrogena Daily Defense to sun exposed areas of skin at least three times a day.    Have your moles regularly examined by a board certified dermatologist AND by yourself or a family member/friend at home.  We recommend that you have your moles examined at least once a year by a board certified dermatologist.  Use your birthday as an annual reminder to have your \"Birthday Suit\" (I.e., your skin) examined; it is a nice birthday gift to yourself to know that your skin is healthy appearing!  Additionally, at-home self examinations may be helpful for detecting a possible melanoma.  Use the ABCDEs we discussed and check your moles once a month at home.        SEBORRHEIC KERATOSIS  A seborrheic keratosis is a harmless warty spot that appears during adult life as a common sign of skin aging.  Seborrheic keratoses can arise on any area of skin, covered or uncovered, with the usual exception of the palms and soles. They do not arise from mucous membranes. Seborrheic keratoses can have highly variable appearance.      Seborrheic keratoses are extremely common. It has been estimated that over 90% of adults over the age of 60 years have one or more of them. They occur in males and females of all races, typically beginning to erupt in the 30s or 40s. They are uncommon under the age of 20 years.  The precise cause of seborrhoeic keratoses is not known.  Seborrhoeic keratoses are considered degenerative in nature. As time goes by, seborrheic keratoses tend to " "become more numerous. Some people inherit a tendency to develop a very large number of them; some people may have hundreds of them.    The name \"seborrheic keratosis\" is misleading, because these lesions are not limited to a seborrhoeic distribution (scalp, mid-face, chest, upper back), nor are they formed from sebaceous glands, nor are they associated with sebum -- which is greasy.  Seborrheic keratosis may also be called \"SK,\" \"Seb K,\" \"basal cell papilloma,\" \"senile wart,\" or \"barnacle.\"      There is no easy way to remove multiple lesions on a single occasion.  Unless a specific lesion is \"inflamed\" and is causing pain or stinging/burning or is bleeding, most insurance companies do not authorize treatment.      ANGIOMA (\"CHERRY ANGIOMA\")  Saavedra angiomas markedly increase in number from about the age of 40, so it has been estimated that 75% of people over 75 years of age have them. Although they also called \"senile angiomas,\" they can occur in young people too - 5% of adolescents have been found to have them.     Cherry angiomas are very common in males and females of any age or race, with no difference in sexes or races affected. They are however more noticeable in white skin than in skin of colour.  There may be a family history of similar lesions. Eruptive (very large number appearing in a short period of time) cherry angiomas have been rarely reported to be associated with internal malignancy and pregnancy.    "

## 2024-11-19 NOTE — PROGRESS NOTES
"[facility] Note     Patient Name: Humphrey Kiser  Encounter Date: November 19, 2024     Have you been cared for by a [facility] Dermatologist in the last 3 years and, if so, which description applies to you?    NO.   I am considered a \"new\" patient and must complete all patient intake questions. I am MALE/not capable of bearing children.    REVIEW OF SYSTEMS:  Have you recently had or currently have any of the following? Recent fever or chills? No  Any non-healing wound? No   PAST MEDICAL HISTORY:  Have you personally ever had or currently have any of the following?  If \"YES,\" then please provide more detail. Skin cancer (such as Melanoma, Basal Cell Carcinoma, Squamous Cell Carcinoma?  No  Tuberculosis, HIV/AIDS, Hepatitis B or C: No  Radiation Treatment No   HISTORY OF IMMUNOSUPPRESSION:   Do you have a history of any of the following:  Systemic Immunosuppression such as Diabetes, Biologic or Immunotherapy, Chemotherapy, Organ Transplantation, Bone Marrow Transplantation or Prednisone?  No     Answering \"YES\" requires the addition of the dotphrase \"IMMUNOSUPPRESSED\" as the first diagnosis of the patient's visit.   FAMILY HISTORY:  Any \"first degree relatives\" (parent, brother, sister, or child) with the following?    Skin Cancer, Pancreatic or Other Cancer? YES, Brother - Skin Cancer   PATIENT EXPERIENCE:    Do you want the Dermatologist to perform a COMPLETE skin exam today including a clinical examination under the \"bra and underwear\" areas?  NO  If necessary, do we have your permission to call and leave a detailed message on your Preferred Phone number that includes your specific medical information?  Yes      No Known Allergies   Current Outpatient Medications:     aspirin (ECOTRIN LOW STRENGTH) 81 mg EC tablet, Take 81 mg by mouth daily, Disp: , Rfl:     betamethasone, augmented, (DIPROLENE) 0.05 % ointment, Apply 1 application topically daily as needed For rosacea  (Patient not taking: " Reported on 3/28/2024), Disp: , Rfl:     Cholecalciferol 50 MCG (2000 UT) CAPS, Take 2,000 Units by mouth daily, Disp: , Rfl:     esomeprazole (NexIUM) 40 MG capsule, Take 40 mg by mouth daily, Disp: , Rfl:     finasteride (PROSCAR) 5 mg tablet, Take 1 tablet (5 mg total) by mouth daily, Disp: 90 tablet, Rfl: 3    fluticasone (FLONASE) 50 mcg/act nasal spray, 1 spray 2 (two) times a day Shake liquid and spray (Patient not taking: Reported on 3/28/2024), Disp: , Rfl:     gabapentin (NEURONTIN) 300 mg capsule, , Disp: , Rfl:     metoprolol succinate (TOPROL-XL) 100 mg 24 hr tablet, Take 100 mg by mouth daily, Disp: , Rfl:     metroNIDAZOLE (METROGEL) 1 % gel, Apply 1 application topically daily as needed (Patient not taking: Reported on 3/28/2024), Disp: , Rfl:     Multiple Vitamins-Minerals (PreserVision AREDS) CAPS, Take 1 capsule by mouth daily , Disp: , Rfl:     olmesartan (BENICAR) 40 mg tablet, Take 1 tablet by mouth daily, Disp: , Rfl:     rosuvastatin (CRESTOR) 10 MG tablet, Take 10 mg by mouth daily, Disp: , Rfl:     tamsulosin (FLOMAX) 0.4 mg, Take 1 capsule by mouth daily with dinner., Disp: 90 capsule, Rfl: 1    tolterodine (DETROL LA) 4 mg 24 hr capsule, Take 1 capsule (4 mg total) by mouth daily, Disp: 90 capsule, Rfl: 3    traZODone (DESYREL) 100 mg tablet, Take 1 tablet (100 mg total) by mouth daily at bedtime, Disp: 90 tablet, Rfl: 3    Trulance 3 MG TABS, Take by mouth daily, Disp: , Rfl:           Whom besides the patient is providing clinical information about today's encounter?   NO ADDITIONAL HISTORIAN (patient alone provided history)    Physical Exam and Assessment/Plan by Diagnosis:    Chief complaint: Patient is a 75 y/o male present for a spot of concern on the Left Cheek, present for 1 year according to wife, pt states lesion itches and is slightly tender to touch. (2) pt also has history of rosacea and has been on Metrogel and Mometasone Cream in the past    ROSACEA    Physical  Exam:  Anatomic Location Affected:  face  Morphological Description:  erythema and papules  Pertinent Positives:  Pertinent Negatives:    Additional History of Present Condition:  present for years, used Metrogel and Mometasone in the past    Assessment and Plan:  Based on a thorough discussion of this condition and the management approach to it (including a comprehensive discussion of the known risks, side effects and potential benefits of treatment), the patient (family) agrees to implement the following specific plan:  Metrogel daily    Rosacea is a chronic rash affecting the mid-face including the nose, cheeks, chin, forehead, and eyelids. The incidence is usually greatest between the ages of 30-60 years and is more common in people with fair skin. Common characteristics include redness, telangiectasias, papules and pustules over affected areas. Rosacea may look similar to acne, but there is a lack of comedones. Occasionally the eyes may also be involved in ocular rosacea. In advanced disease, enlargement of the sebaceous glands in the nose, termed rhinophyma, may be present.     Rosacea results in red spots (papules) and sometimes pustules over the face, but unlike acne there are no blackheads, whiteheads, or cystic nodules. Patients often experience increased facial flushing with prominent blood vessels (erythematotelangiectatic rosacea) and dry, sensitive skin. These symptoms are exacerbated by sun exposure, hot or spicy foods, topical steroids and oil-based facial products.     In ocular rosacea, eyelids may be red and sore due to conjunctivitis, keratitis, and episcleritis. If rhinophyma develops due to enlargement of sebaceous glands, the patient may have an enlarged and irregularly shaped nose with prominent pores. In rosacea that is refractory to treatment, patients can develop persistent redness and swelling of the face due to lymphatic obstruction (Morbihan disease).     Distribution around the cheeks  may be confused with the malar or “butterfly rash” of lupus. However, the rash of lupus spares the nasal creases and lacks papules and pustules. If signs of photosensitivity, oral ulcers, arthritis, and kidney dysfunction are present then consider referral to a rheumatologist.     There are many potential causes of rosacea including genetic, environmental, vascular, and inflammatory factors. These include, but are not limited to:  Chronic exposure to ultraviolet radiation   Increased immune responses in the form of cathelicidins that promote vessel dilation and infiltration with white blood cells (neutrophils) into the dermis  Increased matrix metalloproteinases such as collagen and elastase that remodel normal tissue may contribute to inflammation of the skin making it thicker and harder  There is some evidence to suggest that increased numbers of demodex mites on patient skin may contribute to rosacea papules     General Treatment Approach   Avoid exacerbating factors such as heat, spicy foods, and alcohol   Use daily SPF30+ sunscreen and other methods of coverage for sun protection  Use water-based make-up   Avoid applying topical steroids to affected areas as they can cause perioral dermatitis and exacerbate rosacea     Topical Treatment Approach  Metronidazole cream or gel by itself or in combination with oral antibiotics for more severe cases  Azelaic acid cream or lotion is effective for mild inflammatory rosacea when applied twice daily to affected areas  Brimonidine gel and oxymetazoline hydrochloride cream can reduce facial redness temporarily   Ivermectin cream can treat papulopustular rosacea by controlling demodex mites and inflammation   Pimecrolimus cream or tacrolimus ointment twice a day for 2-3 months can help reduce inflammation    Oral Treatment Approach  Antibiotics such as doxycycline, minocycline, or erythromycin for 1-3 months  Clonidine and carvedilol can help reduce facial flushing and are  "generally well tolerated. Common side effects include low blood pressure, gastrointestinal upset, dry eyes, blurred vision and low heart rate.   Isotretinoin at low doses can be effective for long term treatment when antibiotics fail. Side effects may make it unsuitable for some patients.   NSAIDs such as diclofenac can help reduce discomfort and redness in the skin.     Procedural/Surgical Treatment Approach   Vascular lasers or intense pulsed light treatment may be used to treat persistent telangiectasia and papulopustular rosacea  Plastic surgery and carbon dioxide lasers may be used to treat rhinophyma     MELANOCYTIC NEVI (\"Moles\")    Physical Exam:  Anatomic Location Affected: Mostly on sun-exposed areas of the trunk and upper extremities  Morphological Description:  Scattered, 1-4mm round to ovoid, symmetrical-appearing, even bordered, skin colored to dark brown macules/papules, mostly in sun-exposed areas  Pertinent Positives:  Pertinent Negatives:    Additional History of Present Condition:  present on exam    Assessment and Plan:  Based on a thorough discussion of this condition and the management approach to it (including a comprehensive discussion of the known risks, side effects and potential benefits of treatment), the patient (family) agrees to implement the following specific plan:  Provided handout with information regarding the ABCDE's of moles   Recommend routine skin exams every year   Sun avoidance, protective clothing (known as UPF clothing), and the use of at least SPF 30 sunscreens is advised. Sunscreen should be reapplied every two hours when outside.     SEBORRHEIC KERATOSIS; NON-INFLAMED    Physical Exam:  Anatomic Location Affected:  scattered across trunk, extremities, face  Morphological Description:  Flat and raised, waxy, smooth to warty textured, yellow to brownish-grey to dark brown to blackish, discrete, \"stuck-on\" appearing papules.  Pertinent Positives:  Pertinent " "Negatives:    Additional History of Present Condition:  Patient reports new bumps on the skin.  Denies itch, burn, pain, bleeding or ulceration.  Present constantly; nothing seems to make it worse or better.  No prior treatment.      Assessment and Plan:  Based on a thorough discussion of this condition and the management approach to it (including a comprehensive discussion of the known risks, side effects and potential benefits of treatment), the patient (family) agrees to implement the following specific plan:  Reassured benign    ANGIOMA (\"CHERRY ANGIOMA\")    Physical Exam:  Anatomic Location: scattered across sun exposed areas of the trunk and extremities   Morphologic Description: Firm red to reddish-blue discrete papules  Pertinent Positives:  Pertinent Negatives:    Additional History of Present Condition:  Present on exam.     Assessment and Plan:  Reassured benign    Scribe Attestation      I,:  Cathryn Damico MA am acting as a scribe while in the presence of the attending physician.:       I,:  Tim Jane MD personally performed the services described in this documentation    as scribed in my presence.:                   "

## 2024-11-21 ENCOUNTER — APPOINTMENT (OUTPATIENT)
Dept: PHYSICAL THERAPY | Facility: CLINIC | Age: 74
End: 2024-11-21
Payer: MEDICARE

## 2024-11-24 DIAGNOSIS — N39.0 URINARY TRACT INFECTION WITHOUT HEMATURIA, SITE UNSPECIFIED: ICD-10-CM

## 2024-11-26 ENCOUNTER — APPOINTMENT (OUTPATIENT)
Dept: PHYSICAL THERAPY | Facility: CLINIC | Age: 74
End: 2024-11-26
Payer: MEDICARE

## 2024-11-26 RX ORDER — TOLTERODINE 4 MG/1
4 CAPSULE, EXTENDED RELEASE ORAL DAILY
Qty: 90 CAPSULE | Refills: 1 | Status: SHIPPED | OUTPATIENT
Start: 2024-11-26

## 2025-03-26 DIAGNOSIS — R35.0 BENIGN PROSTATIC HYPERPLASIA WITH URINARY FREQUENCY: ICD-10-CM

## 2025-03-26 DIAGNOSIS — N40.1 BENIGN PROSTATIC HYPERPLASIA WITH URINARY FREQUENCY: ICD-10-CM

## 2025-03-26 RX ORDER — TAMSULOSIN HYDROCHLORIDE 0.4 MG/1
0.4 CAPSULE ORAL
Qty: 90 CAPSULE | Refills: 0 | Status: SHIPPED | OUTPATIENT
Start: 2025-03-26

## 2025-05-15 ENCOUNTER — OFFICE VISIT (OUTPATIENT)
Dept: INTERNAL MEDICINE CLINIC | Facility: CLINIC | Age: 75
End: 2025-05-15
Payer: MEDICARE

## 2025-05-15 VITALS
HEART RATE: 73 BPM | OXYGEN SATURATION: 98 % | HEIGHT: 72 IN | WEIGHT: 252 LBS | TEMPERATURE: 98.3 F | BODY MASS INDEX: 34.13 KG/M2 | DIASTOLIC BLOOD PRESSURE: 80 MMHG | SYSTOLIC BLOOD PRESSURE: 132 MMHG

## 2025-05-15 DIAGNOSIS — K58.0 IRRITABLE BOWEL SYNDROME WITH DIARRHEA: ICD-10-CM

## 2025-05-15 DIAGNOSIS — N40.1 BENIGN PROSTATIC HYPERPLASIA WITH URINARY FREQUENCY: Chronic | ICD-10-CM

## 2025-05-15 DIAGNOSIS — R35.0 BENIGN PROSTATIC HYPERPLASIA WITH URINARY FREQUENCY: Chronic | ICD-10-CM

## 2025-05-15 DIAGNOSIS — E78.2 MIXED HYPERLIPIDEMIA: ICD-10-CM

## 2025-05-15 DIAGNOSIS — I10 ESSENTIAL HYPERTENSION: Primary | Chronic | ICD-10-CM

## 2025-05-15 PROBLEM — Z71.2 ENCOUNTER TO DISCUSS TEST RESULTS: Status: RESOLVED | Noted: 2023-01-13 | Resolved: 2025-05-15

## 2025-05-15 PROBLEM — Z23 NEED FOR VACCINATION: Chronic | Status: RESOLVED | Noted: 2021-12-09 | Resolved: 2025-05-15

## 2025-05-15 PROBLEM — R07.89 COSTOCHONDRAL PAIN: Status: RESOLVED | Noted: 2022-03-03 | Resolved: 2025-05-15

## 2025-05-15 PROBLEM — Z79.899 MEDICATION MANAGEMENT: Status: RESOLVED | Noted: 2023-01-13 | Resolved: 2025-05-15

## 2025-05-15 PROCEDURE — 99204 OFFICE O/P NEW MOD 45 MIN: CPT | Performed by: INTERNAL MEDICINE

## 2025-05-15 RX ORDER — PRUCALOPRIDE 2 MG/1
2 TABLET ORAL DAILY
COMMUNITY

## 2025-05-15 NOTE — PROGRESS NOTES
Name: Humphrey Kiser      : 1950      MRN: [id number]  Encounter Provider: Bunny Chen MD  Encounter Date: 5/15/2025   Encounter department: [facility]IC[facility] [place]  :  Assessment & Plan  Essential hypertension  Appears controlled.  Continue current medication.  Continue follow-up with cardiology.       Mixed hyperlipidemia  Recent blood work shows good control.  Continue current medication.       Benign prostatic hyperplasia with urinary frequency  Stable.  Follow-up with urology.       Irritable bowel syndrome with diarrhea  Continue follow-up with GI after the addition of new medicine.              History of Present Illness   Patient comes in today for first-time visit.  His former PCP is retiring and he is trying to transition his doctors from [place] where he used to live.  He still sees a cardiologist in [place] every 6 months.  But he has no heart disease or valve problems.  No vascular disease.  Was seeing him mainly for blood pressure and cholesterol.  Apparently years ago he was his primary.  He does follow with urology out here for BPH and that has been stable.  Also follows with GI for irritable bowel and pancreas issues.  He has started on a new medicine which has been helpful.  Reports numerous orthopedic issues that he attributes to his age but otherwise he states he is doing okay.  Presently dealing with some sciatica but has an appointment with his chiropractor upcoming.  He has no new complaints today.  Old records were reviewed in the chart.      Review of Systems   Respiratory:  Negative for shortness of breath.    Cardiovascular:  Negative for chest pain.   Gastrointestinal:  Negative for abdominal pain.       Objective   /80 (BP Location: Left arm, Patient Position: Sitting, Cuff Size: Large)   Pulse 73   Temp 98.3 °F (36.8 °C) (Temporal)   Ht 6' (1.829 m)   Wt 114 kg (252 lb)   SpO2 98%   BMI 34.18 kg/m²      Physical Exam  Vitals and nursing  note reviewed.   Constitutional:       General: He is not in acute distress.     Appearance: He is well-developed.   HENT:      Head: Normocephalic and atraumatic.     Eyes:      Conjunctiva/sclera: Conjunctivae normal.       Cardiovascular:      Rate and Rhythm: Normal rate and regular rhythm.      Heart sounds: No murmur heard.  Pulmonary:      Effort: Pulmonary effort is normal. No respiratory distress.      Breath sounds: Normal breath sounds.   Abdominal:      Palpations: Abdomen is soft.      Tenderness: There is no abdominal tenderness.     Musculoskeletal:         General: No swelling.      Cervical back: Neck supple.     Skin:     General: Skin is warm and dry.      Capillary Refill: Capillary refill takes less than 2 seconds.     Neurological:      Mental Status: He is alert.     Psychiatric:         Mood and Affect: Mood normal.

## 2025-06-08 DIAGNOSIS — N39.0 URINARY TRACT INFECTION WITHOUT HEMATURIA, SITE UNSPECIFIED: ICD-10-CM

## 2025-06-09 RX ORDER — TOLTERODINE 4 MG/1
4 CAPSULE, EXTENDED RELEASE ORAL DAILY
Qty: 90 CAPSULE | Refills: 0 | Status: SHIPPED | OUTPATIENT
Start: 2025-06-09

## 2025-06-18 DIAGNOSIS — M19.90 ARTHRITIS: Primary | ICD-10-CM

## 2025-06-18 NOTE — TELEPHONE ENCOUNTER
Reason for call:   [x] Refill   [] Prior Auth  [x] Other: Per patient this med used to be prescribed by his previous doctor that retired.    Office:   [x] PCP/Provider - Bunny Chen MD   [] Specialty/Provider -     Medication: gabapentin 300 mg    Dose/Frequency: 1 cap tid    Quantity: 270 caps    Pharmacy: [facility] - [place], [place] - [address]      Local Pharmacy   Does the patient have enough for 3 days?   [] Yes   [] No - Send as HP to POD    Mail Away Pharmacy   Does the patient have enough for 10 days?   [x] Yes   [] No - Send as HP to POD

## 2025-06-19 RX ORDER — GABAPENTIN 300 MG/1
300 CAPSULE ORAL 3 TIMES DAILY
Qty: 270 CAPSULE | Refills: 1 | Status: SHIPPED | OUTPATIENT
Start: 2025-06-19

## 2025-06-20 RX ORDER — METOPROLOL SUCCINATE 50 MG/1
50 TABLET, EXTENDED RELEASE ORAL DAILY
COMMUNITY
Start: 2025-05-31 | End: 2025-06-20 | Stop reason: SDUPTHER

## 2025-06-20 RX ORDER — CELECOXIB 100 MG/1
100 CAPSULE ORAL EVERY 24 HOURS
COMMUNITY

## 2025-06-20 RX ORDER — IRBESARTAN 300 MG/1
300 TABLET ORAL DAILY
COMMUNITY

## 2025-06-20 RX ORDER — ESOMEPRAZOLE MAGNESIUM 40 MG/1
40 CAPSULE, DELAYED RELEASE ORAL
COMMUNITY

## 2025-06-20 RX ORDER — ASPIRIN 81 MG/1
81 TABLET ORAL DAILY
COMMUNITY

## 2025-06-20 RX ORDER — LINACLOTIDE 72 UG/1
1 CAPSULE, GELATIN COATED ORAL DAILY PRN
COMMUNITY

## 2025-06-20 RX ORDER — TRIAMTERENE AND HYDROCHLOROTHIAZIDE 37.5; 25 MG/1; MG/1
TABLET ORAL EVERY 24 HOURS
COMMUNITY

## 2025-06-23 ENCOUNTER — OFFICE VISIT (OUTPATIENT)
Dept: UROLOGY | Facility: CLINIC | Age: 75
End: 2025-06-23
Payer: MEDICARE

## 2025-06-23 VITALS
HEART RATE: 75 BPM | OXYGEN SATURATION: 94 % | SYSTOLIC BLOOD PRESSURE: 134 MMHG | HEIGHT: 72 IN | WEIGHT: 250 LBS | DIASTOLIC BLOOD PRESSURE: 72 MMHG | BODY MASS INDEX: 33.86 KG/M2

## 2025-06-23 DIAGNOSIS — N39.0 URINARY TRACT INFECTION WITHOUT HEMATURIA, SITE UNSPECIFIED: ICD-10-CM

## 2025-06-23 DIAGNOSIS — N40.1 BENIGN PROSTATIC HYPERPLASIA WITH URINARY FREQUENCY: ICD-10-CM

## 2025-06-23 DIAGNOSIS — R35.0 BENIGN PROSTATIC HYPERPLASIA WITH URINARY FREQUENCY: ICD-10-CM

## 2025-06-23 DIAGNOSIS — N52.8 OTHER MALE ERECTILE DYSFUNCTION: Primary | ICD-10-CM

## 2025-06-23 PROCEDURE — 99213 OFFICE O/P EST LOW 20 MIN: CPT | Performed by: PHYSICIAN ASSISTANT

## 2025-06-23 RX ORDER — TADALAFIL 20 MG/1
20 TABLET ORAL DAILY PRN
Qty: 15 TABLET | Refills: 3 | Status: SHIPPED | OUTPATIENT
Start: 2025-06-23

## 2025-06-23 RX ORDER — TOLTERODINE 4 MG/1
4 CAPSULE, EXTENDED RELEASE ORAL DAILY
Qty: 90 CAPSULE | Refills: 3 | Status: SHIPPED | OUTPATIENT
Start: 2025-06-23

## 2025-06-23 RX ORDER — FINASTERIDE 5 MG/1
5 TABLET, FILM COATED ORAL DAILY
Qty: 90 TABLET | Refills: 3 | Status: SHIPPED | OUTPATIENT
Start: 2025-06-23

## 2025-06-23 RX ORDER — TAMSULOSIN HYDROCHLORIDE 0.4 MG/1
0.4 CAPSULE ORAL
Qty: 90 CAPSULE | Refills: 3 | Status: SHIPPED | OUTPATIENT
Start: 2025-06-23

## 2025-06-23 NOTE — PROGRESS NOTES
Name: Humphrey Kiser      : 1950      MRN: [id number]  Encounter Provider: Landon Maza PA-C  Encounter Date: 2025   Encounter department: [facility] [place]  :  Assessment & Plan  Urinary tract infection without hematuria, site unspecified    Orders:    finasteride (PROSCAR) 5 mg tablet; Take 1 tablet (5 mg total) by mouth daily    tolterodine (DETROL LA) 4 mg 24 hr capsule; Take 1 capsule (4 mg total) by mouth daily    Benign prostatic hyperplasia with urinary frequency    Orders:    tamsulosin (FLOMAX) 0.4 mg; Take 1 capsule (0.4 mg total) by mouth daily with dinner    Other male erectile dysfunction    Orders:    tadalafil (CIALIS) 20 MG tablet; Take 1 tablet (20 mg total) by mouth daily as needed for erectile dysfunction        History of Present Illness   Humphrey Kiser is a 74 y.o. male who presents history of obstructing BPH.  Was to be scheduled for UroLift last year but declined.  Feels comfortable and is fairly stable on his current regime of finasteride, tamsulosin and Detrol LA.  He has erectile dysfunction and is requesting medication.  I sent Cialis to his pharmacy.  AUA SYMPTOM SCORE      Flowsheet Row Most Recent Value   AUA SYMPTOM SCORE    How often have you had a sensation of not emptying your bladder completely after you finished urinating? 1 (P)     How often have you had to urinate again less than two hours after you finished urinating? 1 (P)     How often have you found you stopped and started again several times when you urinate? 1 (P)     How often have you found it difficult to postpone urination? 3 (P)     How often have you had a weak urinary stream? 1 (P)     How often have you had to push or strain to begin urination? 0 (P)     How many times did you most typically get up to urinate from the time you went to bed at night until the time you got up in the morning? 1 (P)     Quality of Life: If you were to spend the rest of your life with your  urinary condition just the way it is now, how would you feel about that? 1 (P)     AUA SYMPTOM SCORE 8 (P)            Review of Systems       Objective   There were no vitals taken for this visit.    Physical Exam GEN: no acute distress    RESP: breathing comfortably with no accessory muscle use    ABD: soft, non-tender, non-distended   INCISION:    EXT: no significant peripheral edema   (Male): Penis circumcised, phallus normal, meatus patent.  Testicles descended into scrotum bilaterally without masses nor tenderness.  No inguinal hernias bilaterally.  HELEN: Prostate is enlarged at 35 grams. The prostate is not boggy. The prostate is not tender.  No nodules noted      RADIOLOGY:   none        Results   Lab Results   Component Value Date    PSA 0.4 02/21/2023     Lab Results   Component Value Date    CALCIUM 9.7 02/21/2023    K 4.2 02/21/2023    CO2 26 02/21/2023     02/21/2023    BUN 21 02/21/2023    CREATININE 1.15 02/21/2023     Lab Results   Component Value Date    WBC 6.58 02/21/2023    HGB 15.0 02/21/2023    HCT 43.4 02/21/2023    MCV 97 02/21/2023     02/21/2023       Office Urine Dip  No results found for this or any previous visit (from the past hour).